# Patient Record
Sex: MALE | Race: WHITE | NOT HISPANIC OR LATINO | URBAN - METROPOLITAN AREA
[De-identification: names, ages, dates, MRNs, and addresses within clinical notes are randomized per-mention and may not be internally consistent; named-entity substitution may affect disease eponyms.]

---

## 2017-07-13 ENCOUNTER — INPATIENT (INPATIENT)
Facility: HOSPITAL | Age: 82
LOS: 1 days | Discharge: HOME | DRG: 194 | End: 2017-07-15
Attending: INTERNAL MEDICINE | Admitting: INTERNAL MEDICINE
Payer: COMMERCIAL

## 2017-07-13 VITALS
OXYGEN SATURATION: 100 % | SYSTOLIC BLOOD PRESSURE: 159 MMHG | DIASTOLIC BLOOD PRESSURE: 96 MMHG | TEMPERATURE: 98 F | HEIGHT: 64 IN | WEIGHT: 179.9 LBS | HEART RATE: 65 BPM | RESPIRATION RATE: 18 BRPM

## 2017-07-13 DIAGNOSIS — Z29.9 ENCOUNTER FOR PROPHYLACTIC MEASURES, UNSPECIFIED: ICD-10-CM

## 2017-07-13 DIAGNOSIS — J18.9 PNEUMONIA, UNSPECIFIED ORGANISM: ICD-10-CM

## 2017-07-13 DIAGNOSIS — I10 ESSENTIAL (PRIMARY) HYPERTENSION: ICD-10-CM

## 2017-07-13 DIAGNOSIS — B33.8 OTHER SPECIFIED VIRAL DISEASES: ICD-10-CM

## 2017-07-13 DIAGNOSIS — E87.1 HYPO-OSMOLALITY AND HYPONATREMIA: ICD-10-CM

## 2017-07-13 DIAGNOSIS — E78.5 HYPERLIPIDEMIA, UNSPECIFIED: ICD-10-CM

## 2017-07-13 DIAGNOSIS — J15.9 UNSPECIFIED BACTERIAL PNEUMONIA: ICD-10-CM

## 2017-07-13 DIAGNOSIS — R63.8 OTHER SYMPTOMS AND SIGNS CONCERNING FOOD AND FLUID INTAKE: ICD-10-CM

## 2017-07-13 DIAGNOSIS — I25.10 ATHEROSCLEROTIC HEART DISEASE OF NATIVE CORONARY ARTERY WITHOUT ANGINA PECTORIS: ICD-10-CM

## 2017-07-13 LAB
ALBUMIN SERPL ELPH-MCNC: 3.7 G/DL — SIGNIFICANT CHANGE UP (ref 3.3–5)
ALP SERPL-CCNC: 70 U/L — SIGNIFICANT CHANGE UP (ref 40–120)
ALT FLD-CCNC: 15 U/L — SIGNIFICANT CHANGE UP (ref 10–45)
ANION GAP SERPL CALC-SCNC: 17 MMOL/L — SIGNIFICANT CHANGE UP (ref 5–17)
AST SERPL-CCNC: 25 U/L — SIGNIFICANT CHANGE UP (ref 10–40)
BASOPHILS NFR BLD AUTO: 0.1 % — SIGNIFICANT CHANGE UP (ref 0–2)
BILIRUB SERPL-MCNC: 0.8 MG/DL — SIGNIFICANT CHANGE UP (ref 0.2–1.2)
BUN SERPL-MCNC: 18 MG/DL — SIGNIFICANT CHANGE UP (ref 7–23)
CALCIUM SERPL-MCNC: 9 MG/DL — SIGNIFICANT CHANGE UP (ref 8.4–10.5)
CHLORIDE SERPL-SCNC: 88 MMOL/L — LOW (ref 96–108)
CO2 SERPL-SCNC: 20 MMOL/L — LOW (ref 22–31)
CREAT SERPL-MCNC: 1.1 MG/DL — SIGNIFICANT CHANGE UP (ref 0.5–1.3)
EOSINOPHIL NFR BLD AUTO: 0.5 % — SIGNIFICANT CHANGE UP (ref 0–6)
GLUCOSE SERPL-MCNC: 95 MG/DL — SIGNIFICANT CHANGE UP (ref 70–99)
HCT VFR BLD CALC: 39.7 % — SIGNIFICANT CHANGE UP (ref 39–50)
HGB BLD-MCNC: 13.8 G/DL — SIGNIFICANT CHANGE UP (ref 13–17)
HPIV3 RNA SPEC QL NAA+PROBE: DETECTED
LYMPHOCYTES # BLD AUTO: 9 % — LOW (ref 13–44)
MCHC RBC-ENTMCNC: 29.3 PG — SIGNIFICANT CHANGE UP (ref 27–34)
MCHC RBC-ENTMCNC: 34.8 G/DL — SIGNIFICANT CHANGE UP (ref 32–36)
MCV RBC AUTO: 84.3 FL — SIGNIFICANT CHANGE UP (ref 80–100)
MONOCYTES NFR BLD AUTO: 8.2 % — SIGNIFICANT CHANGE UP (ref 2–14)
NEUTROPHILS NFR BLD AUTO: 82.2 % — HIGH (ref 43–77)
PLATELET # BLD AUTO: 196 K/UL — SIGNIFICANT CHANGE UP (ref 150–400)
POTASSIUM SERPL-MCNC: 4.5 MMOL/L — SIGNIFICANT CHANGE UP (ref 3.5–5.3)
POTASSIUM SERPL-SCNC: 4.5 MMOL/L — SIGNIFICANT CHANGE UP (ref 3.5–5.3)
PROT SERPL-MCNC: 6.9 G/DL — SIGNIFICANT CHANGE UP (ref 6–8.3)
RAPID RVP RESULT: DETECTED
RBC # BLD: 4.71 M/UL — SIGNIFICANT CHANGE UP (ref 4.2–5.8)
RBC # FLD: 12 % — SIGNIFICANT CHANGE UP (ref 10.3–16.9)
SODIUM SERPL-SCNC: 125 MMOL/L — LOW (ref 135–145)
WBC # BLD: 10 K/UL — SIGNIFICANT CHANGE UP (ref 3.8–10.5)
WBC # FLD AUTO: 10 K/UL — SIGNIFICANT CHANGE UP (ref 3.8–10.5)

## 2017-07-13 PROCEDURE — 93010 ELECTROCARDIOGRAM REPORT: CPT

## 2017-07-13 PROCEDURE — 99285 EMERGENCY DEPT VISIT HI MDM: CPT | Mod: 25

## 2017-07-13 PROCEDURE — 99223 1ST HOSP IP/OBS HIGH 75: CPT

## 2017-07-13 RX ORDER — CLOPIDOGREL BISULFATE 75 MG/1
75 TABLET, FILM COATED ORAL DAILY
Qty: 0 | Refills: 0 | Status: DISCONTINUED | OUTPATIENT
Start: 2017-07-13 | End: 2017-07-15

## 2017-07-13 RX ORDER — LISINOPRIL 2.5 MG/1
40 TABLET ORAL DAILY
Qty: 0 | Refills: 0 | Status: DISCONTINUED | OUTPATIENT
Start: 2017-07-13 | End: 2017-07-15

## 2017-07-13 RX ORDER — CEFTRIAXONE 500 MG/1
1 INJECTION, POWDER, FOR SOLUTION INTRAMUSCULAR; INTRAVENOUS ONCE
Qty: 0 | Refills: 0 | Status: COMPLETED | OUTPATIENT
Start: 2017-07-13 | End: 2017-07-13

## 2017-07-13 RX ORDER — SPIRONOLACTONE 25 MG/1
25 TABLET, FILM COATED ORAL DAILY
Qty: 0 | Refills: 0 | Status: DISCONTINUED | OUTPATIENT
Start: 2017-07-13 | End: 2017-07-14

## 2017-07-13 RX ORDER — METOPROLOL TARTRATE 50 MG
25 TABLET ORAL DAILY
Qty: 0 | Refills: 0 | Status: DISCONTINUED | OUTPATIENT
Start: 2017-07-13 | End: 2017-07-15

## 2017-07-13 RX ORDER — MAGNESIUM OXIDE 400 MG ORAL TABLET 241.3 MG
400 TABLET ORAL DAILY
Qty: 0 | Refills: 0 | Status: DISCONTINUED | OUTPATIENT
Start: 2017-07-13 | End: 2017-07-15

## 2017-07-13 RX ORDER — ACETAMINOPHEN 500 MG
650 TABLET ORAL EVERY 6 HOURS
Qty: 0 | Refills: 0 | Status: DISCONTINUED | OUTPATIENT
Start: 2017-07-13 | End: 2017-07-15

## 2017-07-13 RX ORDER — PANTOPRAZOLE SODIUM 20 MG/1
40 TABLET, DELAYED RELEASE ORAL
Qty: 0 | Refills: 0 | Status: DISCONTINUED | OUTPATIENT
Start: 2017-07-13 | End: 2017-07-15

## 2017-07-13 RX ORDER — ATORVASTATIN CALCIUM 80 MG/1
20 TABLET, FILM COATED ORAL AT BEDTIME
Qty: 0 | Refills: 0 | Status: DISCONTINUED | OUTPATIENT
Start: 2017-07-13 | End: 2017-07-15

## 2017-07-13 RX ORDER — CHOLECALCIFEROL (VITAMIN D3) 125 MCG
1000 CAPSULE ORAL DAILY
Qty: 0 | Refills: 0 | Status: DISCONTINUED | OUTPATIENT
Start: 2017-07-13 | End: 2017-07-15

## 2017-07-13 RX ORDER — HEPARIN SODIUM 5000 [USP'U]/ML
5000 INJECTION INTRAVENOUS; SUBCUTANEOUS EVERY 8 HOURS
Qty: 0 | Refills: 0 | Status: DISCONTINUED | OUTPATIENT
Start: 2017-07-13 | End: 2017-07-15

## 2017-07-13 RX ORDER — AZITHROMYCIN 500 MG/1
500 TABLET, FILM COATED ORAL ONCE
Qty: 0 | Refills: 0 | Status: COMPLETED | OUTPATIENT
Start: 2017-07-13 | End: 2017-07-13

## 2017-07-13 RX ADMIN — AZITHROMYCIN 255 MILLIGRAM(S): 500 TABLET, FILM COATED ORAL at 16:53

## 2017-07-13 RX ADMIN — ATORVASTATIN CALCIUM 20 MILLIGRAM(S): 80 TABLET, FILM COATED ORAL at 22:58

## 2017-07-13 RX ADMIN — HEPARIN SODIUM 5000 UNIT(S): 5000 INJECTION INTRAVENOUS; SUBCUTANEOUS at 22:58

## 2017-07-13 RX ADMIN — Medication 25 MILLIGRAM(S): at 22:58

## 2017-07-13 RX ADMIN — CEFTRIAXONE 100 GRAM(S): 500 INJECTION, POWDER, FOR SOLUTION INTRAMUSCULAR; INTRAVENOUS at 16:23

## 2017-07-13 NOTE — ED PROVIDER NOTE - PHYSICAL EXAMINATION
CONSTITUTIONAL: Well-appearing; well-nourished; in no apparent distress.   HEAD: Normocephalic; atraumatic.   EYES:  conjunctiva and sclera clear  ENT: normal nose; no rhinorrhea; normal pharynx with no erythema or lesions.   NECK: Supple; non-tender;   CARDIOVASCULAR: Normal S1, S2; no murmurs, rubs, or gallops. Regular rate and rhythm.   RESPIRATORY: Breathing easily; breath sounds clear and equal bilaterally; no wheezes, rhonchi, or rales.  GI: Soft; non-distended; non-tender; no palpable organomegaly.   EXT: No cyanosis or edema; N/V intact  SKIN: Normal for age and race; warm; dry; good turgor; no apparent lesions or rash.   NEURO: A & O x 3; face symmetric; grossly unremarkable.   PSYCHOLOGICAL: The patient’s mood and manner are appropriate.

## 2017-07-13 NOTE — ED PROVIDER NOTE - CARE PLAN
Principal Discharge DX:	Chest pain, unspecified type  Secondary Diagnosis:	Pneumonia of right lung due to infectious organism, unspecified part of lung  Secondary Diagnosis:	Hyponatremia

## 2017-07-13 NOTE — ED PROVIDER NOTE - MEDICAL DECISION MAKING DETAILS
87yo M here with PNA found on CT non con (scanned), with fevers last night above 100, also with CP this am, EKG non ischemic but will send troponins. will check labs, give IV abx, and dr. guaman req both cards and pulm eval 89yo M here with PNA found on CT non con (scanned), with fevers last night above 100, also with CP this am, EKG non ischemic but will send troponins. will check labs, give IV abx, and dr. guaman req both cards and pulm eval in the ED

## 2017-07-13 NOTE — H&P ADULT - NSHPPHYSICALEXAM_GEN_ALL_CORE
.  VITAL SIGNS:  T(C): 37.3 (07-13-17 @ 21:20), Max: 37.3 (07-13-17 @ 21:20)  T(F): 99.2 (07-13-17 @ 21:20), Max: 99.2 (07-13-17 @ 21:20)  HR: 78 (07-13-17 @ 21:53) (65 - 78)  BP: 137/64 (07-13-17 @ 21:53) (137/64 - 159/96)  BP(mean): 76 (07-13-17 @ 21:20) (76 - 76)  RR: 16 (07-13-17 @ 21:53) (14 - 18)  SpO2: 98% (07-13-17 @ 21:53) (98% - 100%)    PHYSICAL EXAM:    Constitutional: WDWN resting comfortably in bed; NAD  Head: NC/AT  Eyes: PERRL, EOMI, clear conjunctiva  ENT: no nasal discharge; uvula midline, no oropharyngeal erythema or exudates; MMM  Neck: supple; no JVD or thyromegaly  Respiratory: + egophany @ RLL. Otherwise good air movement in all lung fields. Normal I/E effort. no tachypnea or accessory muscle use.   Cardiac: midline surgical scar, which correlates w/ CABG hx. +S1/S2; RRR; + 2/6 systolic murmur @ RUSB   Gastrointestinal: soft, NT/ND; no rebound or guarding; +BSx4  Extremities: 1+ pedal edema   Musculoskeletal: NROM x4; no joint swelling, tenderness or erythema  Vascular: 2+ radial, femoral, DP/PT pulses B/L  Dermatologic: skin warm, dry and intact; no rashes, wounds, or scars  Neurologic: AAOx3; CNII-XII grossly intact; no focal deficits

## 2017-07-13 NOTE — PROGRESS NOTE ADULT - SUBJECTIVE AND OBJECTIVE BOX
I examined the patient today, reviewed the lab data, xrays and additional studies. Additionally I have reviewed the notes and assessments by the residents and consultants.   At this point I agree with the assessments and plan.  I would also advise close observation, and supportive care.  Pulmonary and cardiac follow up

## 2017-07-13 NOTE — PROGRESS NOTE ADULT - SUBJECTIVE AND OBJECTIVE BOX
Patient is a 88y old  Male who presents with a chief complaint of SOB and cough X 1 week    HPI:  An 89yo M hx of CAD s/p CABG on ASA/plavix, subdural hematoma s/p evacuation, hx stroke no residual, presenting for cough of one week duration with associated progressive SOB.  Cough is productive of yellowish sputum, and complained of nasal congestion.  He complained of chest pain at rest. He has associated fever and chills. Denied any previous Hx of PNA or pulmonary disease. CT chest from outside reported a RML and RLL peribhronchovascular interstitial PNA and pulmonary nodules.  He denied any smoking history. Vaccination is uptodate including pneumococcal. Denied weight loss or night sweat.     REVIEW OF SYSTEMS:  Constitutional: Had fever, fatigue  Eyes: No eye pain, visual disturbances, or discharge  ENMT: Positive for sinus or throat pain. No difficulty hearing, tinnitus, vertigo;   Neck: No pain, stiffness or neck swelling  Respiratory: See HPI  Cardiovascular: No chest pain, palpitations, dizziness or leg swelling  Gastrointestinal: No abdominal or epigastric pain. No nausea, vomiting or hematemesis; No diarrhea or constipation. No melena or hematochezia.  Genitourinary: No dysuria, frequency, hematuria or incontinence  Neurological: No headaches, memory loss, loss of strength, numbness or tremors  Skin: No itching, burning, rashes or lesions   Endocrine: No heat or cold intolerance; No hair loss  Musculoskeletal: No joint pain or swelling; No muscle, back or extremity pain  Psychiatric: No depression, anxiety, mood swings or difficulty sleeping    PAST MEDICAL & SURGICAL HISTORY:  HTN (hypertension)  CVA (cerebral vascular accident)  CAD (coronary artery disease)  No significant past surgical history      FAMILY HISTORY:      SOCIAL HISTORY:  Smoking Status: [ ] Current, [ ] Former, [X ] Never    MEDICATIONS:  Cef/Azithro    Allergies    No Known Allergies    Intolerances        Vital Signs Last 24 Hrs  T(C): 36.7 (13 Jul 2017 13:55), Max: 36.7 (13 Jul 2017 13:55)  T(F): 98 (13 Jul 2017 13:55), Max: 98 (13 Jul 2017 13:55)  HR: 65 (13 Jul 2017 13:55) (65 - 65)  BP: 159/96 (13 Jul 2017 13:55) (159/96 - 159/96)  BP(mean): --  RR: 18 (13 Jul 2017 13:55) (18 - 18)  SpO2: 100% (13 Jul 2017 13:55) (100% - 100%)        PHYSICAL EXAM:    General: Well developed; well nourished; in no acute distress  Eyes: PERRL, EOM intact; conjunctiva and sclera clear  Head: Normocephalic; atraumatic  ENMT: No nasal discharge; airway clear  Neck: Supple; non tender; no masses  Respiratory: Clear to auscultation bilaterally without wheezing, rhonchi or rales  Cardiovascular: Regular rate and rhythm. S1 and S2 Normal; No murmurs, gallops or rubs  Gastrointestinal: Soft non-tender non-distended; Normal bowel sounds; No hepatosplenomegaly  Genitourinary: No costovertebral angle tenderness  Extremities: Normal range of motion, No clubbing, cyanosis or edema  Vascular: Peripheral pulses palpable 2+ bilaterally  Neurological: Alert and oriented x3  Skin: Warm and dry. No obvious rash  Lymph Nodes: No acute cervical or supraclavicular adenopathy  Musculoskeletal: Normal gait, tone, without deformities      LABS:      CBC Full  -  ( 13 Jul 2017 17:06 )  WBC Count : 10.0 K/uL  Hemoglobin : 13.8 g/dL  Hematocrit : 39.7 %  Platelet Count - Automated : 196 K/uL  Mean Cell Volume : 84.3 fL  Mean Cell Hemoglobin : 29.3 pg  Mean Cell Hemoglobin Concentration : 34.8 g/dL  Auto Neutrophil # : x  Auto Lymphocyte # : x  Auto Monocyte # : x  Auto Eosinophil # : x  Auto Basophil # : x  Auto Neutrophil % : 82.2 %  Auto Lymphocyte % : 9.0 %  Auto Monocyte % : 8.2 %  Auto Eosinophil % : 0.5 %  Auto Basophil % : 0.1 %    07-13    125<L>  |  88<L>  |  18  ----------------------------<  95  4.5   |  20<L>  |  1.10    Ca    9.0      13 Jul 2017 17:06    TPro  6.9  /  Alb  3.7  /  TBili  0.8  /  DBili  x   /  AST  25  /  ALT  15  /  AlkPhos  70  07-13        RADIOLOGY & ADDITIONAL STUDIES (The following images were personally reviewed): Patient is a 88y old  Male who presents with a chief complaint of SOB and cough X 1 week    HPI:  An 87yo M hx of CAD s/p CABG on ASA/plavix, subdural hematoma s/p evacuation, hx stroke no residual, presenting for cough of one week duration with associated progressive SOB.  Cough is productive of yellowish sputum, and complained of nasal congestion.  He complained of chest pain at rest. He has associated fever and chills. Denied any previous Hx of PNA or pulmonary disease. CT chest from outside reported a RML and RLL peribhronchovascular interstitial PNA and pulmonary nodules.  He denied any smoking history. Vaccination is uptodate including pneumococcal. Denied weight loss or night sweat. Denies any exposure to mold, birds/exotic pets, or toxic inhalation.    REVIEW OF SYSTEMS:  Constitutional: Had fever, fatigue  Eyes: No eye pain, visual disturbances, or discharge  ENMT: Positive for sinus or throat pain. No difficulty hearing, tinnitus, vertigo;   Neck: No pain, stiffness or neck swelling  Respiratory: See HPI  Cardiovascular: No chest pain, palpitations, dizziness or leg swelling  Gastrointestinal: No abdominal or epigastric pain. No nausea, vomiting or hematemesis; No diarrhea or constipation. No melena or hematochezia.  Genitourinary: No dysuria, frequency, hematuria or incontinence  Neurological: No headaches, memory loss, loss of strength, numbness or tremors  Skin: No itching, burning, rashes or lesions   Endocrine: No heat or cold intolerance; No hair loss  Musculoskeletal: No joint pain or swelling; No muscle, back or extremity pain  Psychiatric: No depression, anxiety, mood swings or difficulty sleeping    PAST MEDICAL & SURGICAL HISTORY:  HTN (hypertension)  CVA (cerebral vascular accident)  CAD (coronary artery disease)  No significant past surgical history      FAMILY HISTORY:      SOCIAL HISTORY:  Smoking Status: [ ] Current, [ ] Former, [X ] Never    MEDICATIONS:  Cef/Azithro    Allergies    No Known Allergies    Intolerances        Vital Signs Last 24 Hrs  T(C): 36.7 (13 Jul 2017 13:55), Max: 36.7 (13 Jul 2017 13:55)  T(F): 98 (13 Jul 2017 13:55), Max: 98 (13 Jul 2017 13:55)  HR: 65 (13 Jul 2017 13:55) (65 - 65)  BP: 159/96 (13 Jul 2017 13:55) (159/96 - 159/96)  BP(mean): --  RR: 18 (13 Jul 2017 13:55) (18 - 18)  SpO2: 100% (13 Jul 2017 13:55) (100% - 100%)        PHYSICAL EXAM:    General: Well developed; well nourished; in no acute distress  Eyes: PERRL, EOM intact; conjunctiva and sclera clear  Head: Normocephalic; atraumatic  ENMT: No nasal discharge; airway clear  Neck: Supple; non tender; no masses  Respiratory: Clear to auscultation bilaterally without wheezing, rhonchi or rales  Cardiovascular: Regular rate and rhythm. S1 and S2 Normal; No murmurs, gallops or rubs  Gastrointestinal: Soft non-tender non-distended; Normal bowel sounds; No hepatosplenomegaly  Genitourinary: No costovertebral angle tenderness  Extremities: Normal range of motion, No clubbing, cyanosis or edema  Vascular: Peripheral pulses palpable 2+ bilaterally  Neurological: Alert and oriented x3  Skin: Warm and dry. No obvious rash  Lymph Nodes: No acute cervical or supraclavicular adenopathy  Musculoskeletal: Normal gait, tone, without deformities      LABS:      CBC Full  -  ( 13 Jul 2017 17:06 )  WBC Count : 10.0 K/uL  Hemoglobin : 13.8 g/dL  Hematocrit : 39.7 %  Platelet Count - Automated : 196 K/uL  Mean Cell Volume : 84.3 fL  Mean Cell Hemoglobin : 29.3 pg  Mean Cell Hemoglobin Concentration : 34.8 g/dL  Auto Neutrophil # : x  Auto Lymphocyte # : x  Auto Monocyte # : x  Auto Eosinophil # : x  Auto Basophil # : x  Auto Neutrophil % : 82.2 %  Auto Lymphocyte % : 9.0 %  Auto Monocyte % : 8.2 %  Auto Eosinophil % : 0.5 %  Auto Basophil % : 0.1 %    07-13    125<L>  |  88<L>  |  18  ----------------------------<  95  4.5   |  20<L>  |  1.10    Ca    9.0      13 Jul 2017 17:06    TPro  6.9  /  Alb  3.7  /  TBili  0.8  /  DBili  x   /  AST  25  /  ALT  15  /  AlkPhos  70  07-13        RADIOLOGY & ADDITIONAL STUDIES (The following images were personally reviewed): Patient is a 88y old  Male who presents with a chief complaint of SOB and cough X 1 week    HPI:  An 89yo M hx of CAD s/p CABG on ASA/plavix, subdural hematoma s/p evacuation, hx stroke no residual, presenting for cough of one week duration with associated progressive SOB.  Cough is productive of yellowish sputum, and complained of nasal congestion.  He complained of chest pain at rest. He has associated fever and chills. Denied any previous Hx of PNA or pulmonary disease. CT chest from outside reported a RML and RLL peribhronchovascular interstitial PNA and pulmonary nodules.  He denied any smoking history. Vaccination is uptodate including pneumococcal. Denied weight loss or night sweat. Denies any exposure to mold, birds/exotic pets, or toxic inhalation.    REVIEW OF SYSTEMS:  Constitutional: Had fever, fatigue  Eyes: No eye pain, visual disturbances, or discharge  ENMT: Positive for sinus or throat pain. No difficulty hearing, tinnitus, vertigo;   Neck: No pain, stiffness or neck swelling  Respiratory: See HPI  Cardiovascular: No chest pain, palpitations, dizziness or leg swelling  Gastrointestinal: No abdominal or epigastric pain. No nausea, vomiting or hematemesis; No diarrhea or constipation. No melena or hematochezia.  Genitourinary: No dysuria, frequency, hematuria or incontinence  Neurological: No headaches, memory loss, loss of strength, numbness or tremors  Skin: No itching, burning, rashes or lesions   Endocrine: No heat or cold intolerance; No hair loss  Musculoskeletal: No joint pain or swelling; No muscle, back or extremity pain  Psychiatric: No depression, anxiety, mood swings or difficulty sleeping    PAST MEDICAL & SURGICAL HISTORY:  HTN (hypertension)  CVA (cerebral vascular accident)  CAD (coronary artery disease)  No significant past surgical history      FAMILY HISTORY:      SOCIAL HISTORY:  Smoking Status: [ ] Current, [ ] Former, [X ] Never    MEDICATIONS:  Cef/Azithro    Allergies    No Known Allergies    Intolerances        Vital Signs Last 24 Hrs  T(C): 36.7 (13 Jul 2017 13:55), Max: 36.7 (13 Jul 2017 13:55)  T(F): 98 (13 Jul 2017 13:55), Max: 98 (13 Jul 2017 13:55)  HR: 65 (13 Jul 2017 13:55) (65 - 65)  BP: 159/96 (13 Jul 2017 13:55) (159/96 - 159/96)  BP(mean): --  RR: 18 (13 Jul 2017 13:55) (18 - 18)  SpO2: 100% (13 Jul 2017 13:55) (100% - 100%)        PHYSICAL EXAM:    General: Well developed; well nourished; in no acute distress  Eyes: PERRL, EOM intact; conjunctiva and sclera clear  Head: Normocephalic; atraumatic  ENMT: No nasal discharge; airway clear  Neck: Supple; non tender; no masses  Respiratory: Mildly decreased breath sound in right basal, otherwise clear to auscultation bilaterally without wheezing, rhonchi or rales  Cardiovascular: Regular rate and rhythm. S1 and S2 Normal; No murmurs, gallops or rubs  Gastrointestinal: Soft non-tender non-distended; Normal bowel sounds; No hepatosplenomegaly  Genitourinary: No costovertebral angle tenderness  Extremities: Normal range of motion, No clubbing, cyanosis or edema  Vascular: Peripheral pulses palpable 2+ bilaterally  Neurological: Alert and oriented x3  Skin: Warm and dry. No obvious rash  Lymph Nodes: No acute cervical or supraclavicular adenopathy  Musculoskeletal: Normal gait, tone, without deformities      LABS:      CBC Full  -  ( 13 Jul 2017 17:06 )  WBC Count : 10.0 K/uL  Hemoglobin : 13.8 g/dL  Hematocrit : 39.7 %  Platelet Count - Automated : 196 K/uL  Mean Cell Volume : 84.3 fL  Mean Cell Hemoglobin : 29.3 pg  Mean Cell Hemoglobin Concentration : 34.8 g/dL  Auto Neutrophil # : x  Auto Lymphocyte # : x  Auto Monocyte # : x  Auto Eosinophil # : x  Auto Basophil # : x  Auto Neutrophil % : 82.2 %  Auto Lymphocyte % : 9.0 %  Auto Monocyte % : 8.2 %  Auto Eosinophil % : 0.5 %  Auto Basophil % : 0.1 %    07-13    125<L>  |  88<L>  |  18  ----------------------------<  95  4.5   |  20<L>  |  1.10    Ca    9.0      13 Jul 2017 17:06    TPro  6.9  /  Alb  3.7  /  TBili  0.8  /  DBili  x   /  AST  25  /  ALT  15  /  AlkPhos  70  07-13        RADIOLOGY & ADDITIONAL STUDIES (The following images were personally reviewed): Pulmonary Consult  Consult from: Dr. Evans    Patient is a 88y old  Male who presents with a chief complaint of SOB and cough X 1 week    HPI:  An 89yo M hx of CAD s/p CABG on ASA/plavix, subdural hematoma s/p evacuation, hx stroke no residual, presenting for cough of one week duration with associated progressive SOB.  Cough is productive of yellowish sputum, and complained of nasal congestion.  He complained of chest pain at rest. He has associated fever and chills. Denied any previous Hx of PNA or pulmonary disease. CT chest from outside reported a RML and RLL peribhronchovascular interstitial PNA and pulmonary nodules.  He denied any smoking history. Vaccination is uptodate including pneumococcal. Denied weight loss or night sweat. Denies any exposure to mold, birds/exotic pets, or toxic inhalation.    REVIEW OF SYSTEMS:  Constitutional: Had fever, fatigue  Eyes: No eye pain, visual disturbances, or discharge  ENMT: Positive for sinus or throat pain. No difficulty hearing, tinnitus, vertigo;   Neck: No pain, stiffness or neck swelling  Respiratory: See HPI  Cardiovascular: No chest pain, palpitations, dizziness or leg swelling  Gastrointestinal: No abdominal or epigastric pain. No nausea, vomiting or hematemesis; No diarrhea or constipation. No melena or hematochezia.  Genitourinary: No dysuria, frequency, hematuria or incontinence  Neurological: No headaches, memory loss, loss of strength, numbness or tremors  Skin: No itching, burning, rashes or lesions   Endocrine: No heat or cold intolerance; No hair loss  Musculoskeletal: No joint pain or swelling; No muscle, back or extremity pain  Psychiatric: No depression, anxiety, mood swings or difficulty sleeping    PAST MEDICAL & SURGICAL HISTORY:  HTN (hypertension)  CVA (cerebral vascular accident)  CAD (coronary artery disease)  No significant past surgical history      FAMILY HISTORY:      SOCIAL HISTORY:  Smoking Status: [ ] Current, [ ] Former, [X ] Never    MEDICATIONS:  Cef/Azithro    Allergies    No Known Allergies    Intolerances        Vital Signs Last 24 Hrs  T(C): 36.7 (13 Jul 2017 13:55), Max: 36.7 (13 Jul 2017 13:55)  T(F): 98 (13 Jul 2017 13:55), Max: 98 (13 Jul 2017 13:55)  HR: 65 (13 Jul 2017 13:55) (65 - 65)  BP: 159/96 (13 Jul 2017 13:55) (159/96 - 159/96)  BP(mean): --  RR: 18 (13 Jul 2017 13:55) (18 - 18)  SpO2: 100% (13 Jul 2017 13:55) (100% - 100%)        PHYSICAL EXAM:    General: Well developed; well nourished; in no acute distress  Eyes: PERRL, EOM intact; conjunctiva and sclera clear  Head: Normocephalic; atraumatic  ENMT: No nasal discharge; airway clear  Neck: Supple; non tender; no masses  Respiratory: Mildly decreased breath sound in right basal, otherwise clear to auscultation bilaterally without wheezing, rhonchi or rales  Cardiovascular: Regular rate and rhythm. S1 and S2 Normal; No murmurs, gallops or rubs  Gastrointestinal: Soft non-tender non-distended; Normal bowel sounds; No hepatosplenomegaly  Genitourinary: No costovertebral angle tenderness  Extremities: Normal range of motion, No clubbing, cyanosis or edema  Vascular: Peripheral pulses palpable 2+ bilaterally  Neurological: Alert and oriented x3  Skin: Warm and dry. No obvious rash  Lymph Nodes: No acute cervical or supraclavicular adenopathy  Musculoskeletal: Normal gait, tone, without deformities      LABS:      CBC Full  -  ( 13 Jul 2017 17:06 )  WBC Count : 10.0 K/uL  Hemoglobin : 13.8 g/dL  Hematocrit : 39.7 %  Platelet Count - Automated : 196 K/uL  Mean Cell Volume : 84.3 fL  Mean Cell Hemoglobin : 29.3 pg  Mean Cell Hemoglobin Concentration : 34.8 g/dL  Auto Neutrophil # : x  Auto Lymphocyte # : x  Auto Monocyte # : x  Auto Eosinophil # : x  Auto Basophil # : x  Auto Neutrophil % : 82.2 %  Auto Lymphocyte % : 9.0 %  Auto Monocyte % : 8.2 %  Auto Eosinophil % : 0.5 %  Auto Basophil % : 0.1 %    07-13    125<L>  |  88<L>  |  18  ----------------------------<  95  4.5   |  20<L>  |  1.10    Ca    9.0      13 Jul 2017 17:06    TPro  6.9  /  Alb  3.7  /  TBili  0.8  /  DBili  x   /  AST  25  /  ALT  15  /  AlkPhos  70  07-13        RADIOLOGY & ADDITIONAL STUDIES (The following images were personally reviewed):

## 2017-07-13 NOTE — H&P ADULT - PROBLEM SELECTOR PLAN 8
DASH Diet  Monitor/ Replete Lytes PRN  No IVF    FULL CODE  Daughter: Cande, [phone]'    Dispo: [address] [facility] under Dr. Main

## 2017-07-13 NOTE — PROGRESS NOTE ADULT - ASSESSMENT
An 87yo M hx of CAD s/p CABG on ASA/plavix, subdural hematoma s/p evacuation, hx stroke no residual, presenting for cough of one week duration with associated progressive SOB, productive cough and fever.

## 2017-07-13 NOTE — PROGRESS NOTE ADULT - PROBLEM SELECTOR PLAN 1
He has a clinical presentation of Pneumonia and CT imaging showing  RML and RLL peribronchovascular interstitial PNA with reticulonodular appearance with some pulmonary nodules in RML measuring 1.2 cm.(Image couldn't be reviewed as we have only report)  -Agree with CAP coverage with ceftriaxone and Azithromycin.  -Blood culture, sputum culture  -Please send procalcitonin  -Requested the daughter to bring us the copy of CD for review. We will need to check CXR atleast now.  -Cardiac work up for the chest pain sofar negative(Normal Trops and ECG). Cardiology work up and evaluation in process.  -We will need to have a follow up CT to evaluate for resolution of consolidation and nodules reported , to r/o underlying malignancy. He has a clinical presentation of Pneumonia and CT imaging showing  RML and RLL peribronchovascular interstitial PNA with reticulonodular appearance with some pulmonary nodules in RML measuring 1.2 cm.(Image couldn't be reviewed as we have only report). We would have to review it to r/o possible underlying malignancy, other ILD with peribronchovascular distribution.  -Requested the daughter to bring us the copy of CD for review.   -Agree with CAP coverage with ceftriaxone and Azithromycin.  -Blood culture, sputum culture  -Please send procalcitonin  -Cardiac work up for the chest pain sofar negative(Normal Trops and ECG). Cardiology work up and evaluation in process.  -We will need to have a follow up CT to evaluate for resolution of consolidation and nodules reported , to r/o underlying malignancy or other ILD(though the unilateral lesion speaks against it). He has a clinical presentation of Pneumonia and CT imaging showing  RML and RLL peribronchovascular interstitial PNA with reticulonodular appearance with some pulmonary nodules in RML measuring 1.2 cm.(Image couldn't be reviewed as we have only report). We would have to review it to r/o possible underlying malignancy, other ILD with peribronchovascular distribution.  -Requested the daughter to bring us the copy of CD for review.   -Agree with CAP coverage with ceftriaxone and Azithromycin.  -Check urine legionella AG(possibility with hyponatremia), check blood strept Ag, mycoplasma pneumoniae IGG/IGM, Respiratory viral panel  -Blood culture, sputum culture  -Please send procalcitonin  -Cardiac work up for the chest pain sofar negative(Normal Trops and ECG). Cardiology work up and evaluation in process.  -We will need to have a follow up CT to evaluate for resolution of consolidation and nodules reported , to r/o underlying malignancy or other ILD(though the unilateral lesion speaks against it). He has a clinical presentation of Pneumonia and CT imaging showing  RML and RLL peribronchovascular interstitial PNA with reticulonodular appearance with some pulmonary nodules in RML measuring 1.2 cm.(Image couldn't be reviewed as we have only report). We would have to review it to r/o possible underlying malignancy, other ILD with peribronchovascular distribution.  -Requested the daughter to bring us the copy of CD for review.   -Agree with CAP coverage with ceftriaxone and Azithromycin.  -Check urine legionella AG(possibility with hyponatremia), check blood strept Ag, mycoplasma pneumoniae IGG/IGM, Respiratory viral panel  -Blood culture, sputum culture  -Please send procalcitonin  -Cardiac work up for the chest pain sofar negative(Normal Trops and ECG). Cardiology work up and evaluation in process.  -We will need to have a follow up CT to evaluate for resolution of consolidation and nodules reported , to r/o underlying malignancy or other ILD(though the unilateral lesion speaks against it).  -PSI score 108 Risk Class IV, 8.2-9.3% mortality. Hospitalization recommended based on risk.

## 2017-07-13 NOTE — H&P ADULT - HISTORY OF PRESENT ILLNESS
89 y/o M w/ pmhx of CAD s/p CABG (19 yrs ago @ [place]), subdural hematoma s/p evacuation, CVA (no residual defecits), presents with SOB and cough x 1 week. SOB has been progressive over the course of the week and the cough is productive with yellow sputum. Patient also with nasal congestion throughout this time with low grade fevers and chills. Patient states temp at home was 100.1. Patient denies any previous lung pathology. Patient states he went to visit PMD and he was sent for a CT of his chest, which showed RML and RLL peribronchovascular interstitial PNA with pulmonary nodules. Vaccinations are up to date including pneumococcal. Patient denies any headaches, vision changes, chest pain, abdominal pain, N/V/D, weight changes, urinary or bowel symptoms. Patient denies any recent exposure to sick contacts.     Upon arrival to the ED, VS remained stable. Temp: 99.0, BP: 149/84, HR: 76, RR: 14, O2: 98% on room air. Labs significant for hyponatremia of 125. RVP + parainfluenza. Troponin negative x 1. EKG with no signs of ischemia. Patient to be admitted under Cardiology.

## 2017-07-13 NOTE — H&P ADULT - PROBLEM SELECTOR PLAN 4
On Plavix at home. NOT on ASA. No chest pain upon arrival. Troponin negative x 1. EKG negative for ischemia  - c/w Plavix 75  - consider echocardiogram ?

## 2017-07-13 NOTE — ED CLERICAL - NS ED CLERK NOTE PRE-ARRIVAL INFORMATION; ADDITIONAL PRE-ARRIVAL INFORMATION
87 Y/O M ABDIEL FIGUEROA BEING SENT IN BY DR IRVIN FOR PNEUMONIA CP SOB SINUSITIS PLEASE CALL DR JORGENSEN FOR PULMONARY AND DR INFANTE FOR CARDIO (LL)

## 2017-07-13 NOTE — H&P ADULT - PROBLEM SELECTOR PLAN 1
SOB and productive cough x 1 week. 0/4 SIRS upon arrival. CT Chest done as outpatient suggestive of RML, RLL peribronchovascular interstitial PNA, likely 2/2 community acquired PNA. Unlikely HCAP in the absence of MRSA risk factors. No recent hospitalizations. Patient w/ hyponatremia, cannot r/o Legionella in setting of UES outbreak. Patient HD stable, afebrile w/o leukocytosis.  - will treat with Ceftriaxone + Azithromycin for CAP  - f/u urine legionella   - f/u Mycoplasma, Strep serology  - pulmonary on board, f/u rec's in regards to pulmonary nodules. negative for B-symptoms  - monitor respiratory status. SOB and productive cough x 1 week. 0/4 SIRS upon arrival. CT Chest done as outpatient suggestive of RML, RLL peribronchovascular interstitial PNA, likely 2/2 community acquired PNA. Unlikely HCAP in the absence of MRSA risk factors. No recent hospitalizations. Patient w/ hyponatremia, cannot r/o Legionella in setting of UES outbreak. Patient HD stable, afebrile w/o leukocytosis.  - will treat with Ceftriaxone + Azithromycin for CAP  - f/u urine legionella   - f/u Mycoplasma, Strep serology  - pulmonary on board, f/u rec's in regards to pulmonary nodules. negative for B-symptoms  - monitor respiratory status.  - f/u AM CXR SOB and productive cough x 1 week. 0/4 SIRS upon arrival. CT Chest done as outpatient suggestive of RML, RLL peribronchovascular interstitial PNA, likely 2/2 community acquired PNA. Unlikely HCAP in the absence of MRSA risk factors. No recent hospitalizations. Patient w/ hyponatremia, cannot r/o Legionella in setting of UES outbreak. Patient HD stable, afebrile w/o leukocytosis.  - will treat with Ceftriaxone + Azithromycin for CAP  - f/u urine legionella   - f/u Mycoplasma, Strep serology  - f/u Blood Cx   - pulmonary on board, f/u rec's in regards to pulmonary nodules. negative for B-symptoms  - monitor respiratory status.  - f/u AM CXR

## 2017-07-13 NOTE — H&P ADULT - PROBLEM SELECTOR PLAN 2
Unclear etiology. Patient euvolemic on exam. Possibly 2/2 lung pathology. Etiology includes legionella vs SIADH vs hypovolemia  - urine legionella  - urine lytes   - trend BMP

## 2017-07-13 NOTE — H&P ADULT - ATTENDING COMMENTS
Agree with History and Physical, History of Present Illness, Allergies/Medications, Patient History, Risk Assessment, Physical Exam, Labs and Results, Assessment and Plan

## 2017-07-13 NOTE — ED ADULT NURSE NOTE - OBJECTIVE STATEMENT
Patient arrived to ED via walk-in stating, "I have had a cough for over a week.  I am coughing up green phlegm."  Patient is A&Ox3, in NAD, complaining of productive cough and VOGEL.  Per patient, PMD sending him in for confirmed PNA.  PMHx of CAD with CABG, CVA and HTN.  Will continue with plan of care.

## 2017-07-13 NOTE — H&P ADULT - ASSESSMENT
89 y/o M w/ pmhx of CAD s/p CABG (19 yrs ago @ [place]), subdural hematoma s/p evacuation, CVA (no residual defecits), being admitted for CAP.

## 2017-07-13 NOTE — ED PROVIDER NOTE - OBJECTIVE STATEMENT
87yo M hx of CAD s/p CABG on ASA/plavix, subdural hematoma s/p evacuation, hx stroke no residual, here today for 9 days of cough congestion, worsening. had ct non con today w/ shows RML and RLL peribhronchovascular interstitial PNA. Also had episode of CP this am with tightness, so sent to ED given his hx of CAD. Pt states compliant with meds. Denies any VOGEL, but is feeling SOB at rest in general compared to a week ago, really feels it when he speaks. Cough productive of sputum, and having continued nasal congestion that is not improved

## 2017-07-13 NOTE — H&P ADULT - NSHPREVIEWOFSYSTEMS_GEN_ALL_CORE
ROS + SOB, Cough  ROS negative for headaches, vision changes, chest pain, abdominal pain, N/V/D, weight changes, urinary or bowel symptoms. Patient denies any recent exposure to sick contacts.

## 2017-07-13 NOTE — CONSULT NOTE ADULT - ATTENDING COMMENTS
An 87yo M hx of:  CAD s/p CABG on ASA/plavix  subdural hematoma s/p evacuation  hx stroke no residual deficit    He presented with cough x 1week duration with associated progressive SOB  Cough is productive of yellowish sputum. He has associated fever and chills.  Denies weight loss or night sweats.   Additionally, patient complained of nasal congestion.    He complained of chest pain at rest.  Denies any exposure to mold, birds/exotic pets, or toxic inhalation.  Denied any previous Hx of PNA or pulmonary disease.   CT chest from outside reported a RML and RLL loreto-bronchovascular interstitial PNA and pulmonary nodules.  We have not reviewed the CT chest, which we are trying to get from the outside facility.  He denied any smoking history.   The patient had received pneumococcal vaccine in the past.   Agree with physical exam. Does not appear to be toxic. No fevers. SpO2 on RA at rest  is 95%. Lungs demonstrated no adventitious sounds. No rash. No ear discharge. Cardiovascular exam: No murmurs.  CXR shows mild right paracardiac haziness. Difficult to distinguish parenchymal opacity from pulmonary vessels. Lateral not done.  Labs: WBC 10K. PMN 82.2%  Na 125  Throat swab: Para-influenza 3 noted  Assessment:  Treat for community acquired pneumonia for now. Agree with Ceftriaxone and Azithromycin. F/U clinical response. Check Pro-calcitonin C.  It is possible that if nodules were seen on CT scan, the opacity and nodules may be due to parainfluenza pneumonia  Send Urine for Legionella pneumophilia A antigen. Send PCR for Mycoplasma and Chlamydia  Blood and sputum cultures pending  We are awaiting results of CT chest  We would like to An 87yo M hx of:  CAD s/p CABG on ASA/plavix  subdural hematoma s/p evacuation  hx stroke no residual deficit    He presented with cough x 1week duration with associated progressive SOB  Cough is productive of yellowish sputum. He has associated fever and chills.  Denies weight loss or night sweats.   Additionally, patient complained of nasal congestion.    He complained of chest pain at rest.  Denies any exposure to mold, birds/exotic pets, or toxic inhalation.  Denied any previous Hx of PNA or pulmonary disease.   CT chest from outside reported a RML and RLL loreto-bronchovascular interstitial PNA and pulmonary nodules.  We have not reviewed the CT chest, which we are trying to get from the outside facility.  He denied any smoking history.   The patient had received pneumococcal vaccine in the past.   Agree with physical exam. Does not appear to be toxic. No fevers. SpO2 on RA at rest  is 95%. Lungs demonstrated expiratory wheezing at basal lung field. No rash. No ear discharge. Cardiovascular positive for 1/6 systolic murmur at the aortic area.  CXR shows mild right paracardiac haziness. Difficult to distinguish parenchymal opacity from pulmonary vessels. Lateral not done.  Labs: WBC 10K. PMN 82.2%  Na 125  Throat swab: Para-influenza 3 noted  Assessment:  Treat for community acquired pneumonia for now. Agree with Ceftriaxone and Azithromycin. F/U clinical response. Check Pro-calcitonin C.  It is possible that if nodules were seen on CT scan, the opacity and nodules may be due to parainfluenza pneumonia  Send Urine for Legionella pneumophilia A antigen. Send PCR for Mycoplasma and Chlamydia  Blood and sputum cultures pending  We are awaiting results of CT chest  We would like to

## 2017-07-14 DIAGNOSIS — I63.9 CEREBRAL INFARCTION, UNSPECIFIED: ICD-10-CM

## 2017-07-14 LAB
ANION GAP SERPL CALC-SCNC: 12 MMOL/L — SIGNIFICANT CHANGE UP (ref 5–17)
ANION GAP SERPL CALC-SCNC: 13 MMOL/L — SIGNIFICANT CHANGE UP (ref 5–17)
ANION GAP SERPL CALC-SCNC: 15 MMOL/L — SIGNIFICANT CHANGE UP (ref 5–17)
BUN SERPL-MCNC: 13 MG/DL — SIGNIFICANT CHANGE UP (ref 7–23)
CALCIUM SERPL-MCNC: 8.6 MG/DL — SIGNIFICANT CHANGE UP (ref 8.4–10.5)
CALCIUM SERPL-MCNC: 8.7 MG/DL — SIGNIFICANT CHANGE UP (ref 8.4–10.5)
CALCIUM SERPL-MCNC: 8.8 MG/DL — SIGNIFICANT CHANGE UP (ref 8.4–10.5)
CHLORIDE SERPL-SCNC: 86 MMOL/L — LOW (ref 96–108)
CHLORIDE SERPL-SCNC: 89 MMOL/L — LOW (ref 96–108)
CHLORIDE SERPL-SCNC: 90 MMOL/L — LOW (ref 96–108)
CHOLEST SERPL-MCNC: 117 MG/DL — SIGNIFICANT CHANGE UP (ref 10–199)
CO2 SERPL-SCNC: 20 MMOL/L — LOW (ref 22–31)
CO2 SERPL-SCNC: 21 MMOL/L — LOW (ref 22–31)
CO2 SERPL-SCNC: 21 MMOL/L — LOW (ref 22–31)
CREAT ?TM UR-MCNC: 57 MG/DL — SIGNIFICANT CHANGE UP
CREAT SERPL-MCNC: 1 MG/DL — SIGNIFICANT CHANGE UP (ref 0.5–1.3)
CREAT SERPL-MCNC: 1 MG/DL — SIGNIFICANT CHANGE UP (ref 0.5–1.3)
CREAT SERPL-MCNC: 1.1 MG/DL — SIGNIFICANT CHANGE UP (ref 0.5–1.3)
GLUCOSE SERPL-MCNC: 111 MG/DL — HIGH (ref 70–99)
GLUCOSE SERPL-MCNC: 119 MG/DL — HIGH (ref 70–99)
GLUCOSE SERPL-MCNC: 139 MG/DL — HIGH (ref 70–99)
HCT VFR BLD CALC: 38 % — LOW (ref 39–50)
HDLC SERPL-MCNC: 35 MG/DL — LOW (ref 40–125)
HGB BLD-MCNC: 13.6 G/DL — SIGNIFICANT CHANGE UP (ref 13–17)
LEGIONELLA AG UR QL: NEGATIVE — SIGNIFICANT CHANGE UP
LIPID PNL WITH DIRECT LDL SERPL: 68 MG/DL — SIGNIFICANT CHANGE UP
MCHC RBC-ENTMCNC: 30.2 PG — SIGNIFICANT CHANGE UP (ref 27–34)
MCHC RBC-ENTMCNC: 35.8 G/DL — SIGNIFICANT CHANGE UP (ref 32–36)
MCV RBC AUTO: 84.3 FL — SIGNIFICANT CHANGE UP (ref 80–100)
OSMOLALITY SERPL: 257 MOSM/KG — LOW (ref 280–301)
OSMOLALITY UR: 510 MOSMOL/KG — SIGNIFICANT CHANGE UP (ref 100–650)
PHOSPHATE SERPL-MCNC: 2.4 MG/DL — LOW (ref 2.5–4.5)
PLATELET # BLD AUTO: 198 K/UL — SIGNIFICANT CHANGE UP (ref 150–400)
POTASSIUM SERPL-MCNC: 4.1 MMOL/L — SIGNIFICANT CHANGE UP (ref 3.5–5.3)
POTASSIUM SERPL-MCNC: 4.4 MMOL/L — SIGNIFICANT CHANGE UP (ref 3.5–5.3)
POTASSIUM SERPL-MCNC: 4.5 MMOL/L — SIGNIFICANT CHANGE UP (ref 3.5–5.3)
POTASSIUM SERPL-SCNC: 4.1 MMOL/L — SIGNIFICANT CHANGE UP (ref 3.5–5.3)
POTASSIUM SERPL-SCNC: 4.4 MMOL/L — SIGNIFICANT CHANGE UP (ref 3.5–5.3)
POTASSIUM SERPL-SCNC: 4.5 MMOL/L — SIGNIFICANT CHANGE UP (ref 3.5–5.3)
PROCALCITONIN SERPL-MCNC: 0.12 NG/ML — HIGH (ref 0–0.04)
RBC # BLD: 4.51 M/UL — SIGNIFICANT CHANGE UP (ref 4.2–5.8)
RBC # FLD: 12.1 % — SIGNIFICANT CHANGE UP (ref 10.3–16.9)
SODIUM SERPL-SCNC: 121 MMOL/L — LOW (ref 135–145)
SODIUM SERPL-SCNC: 122 MMOL/L — LOW (ref 135–145)
SODIUM SERPL-SCNC: 124 MMOL/L — LOW (ref 135–145)
SODIUM UR-SCNC: 113 MMOL/L — SIGNIFICANT CHANGE UP
SODIUM UR-SCNC: 56 MMOL/L — SIGNIFICANT CHANGE UP
TOTAL CHOLESTEROL/HDL RATIO MEASUREMENT: 3.3 RATIO — LOW (ref 3.4–9.6)
TRIGL SERPL-MCNC: 72 MG/DL — SIGNIFICANT CHANGE UP (ref 10–149)
WBC # BLD: 9.7 K/UL — SIGNIFICANT CHANGE UP (ref 3.8–10.5)
WBC # FLD AUTO: 9.7 K/UL — SIGNIFICANT CHANGE UP (ref 3.8–10.5)

## 2017-07-14 PROCEDURE — 93306 TTE W/DOPPLER COMPLETE: CPT | Mod: 26

## 2017-07-14 PROCEDURE — 93010 ELECTROCARDIOGRAM REPORT: CPT

## 2017-07-14 PROCEDURE — 99233 SBSQ HOSP IP/OBS HIGH 50: CPT

## 2017-07-14 PROCEDURE — 99254 IP/OBS CNSLTJ NEW/EST MOD 60: CPT

## 2017-07-14 PROCEDURE — 71010: CPT | Mod: 26

## 2017-07-14 RX ORDER — SODIUM CHLORIDE 5 G/100ML
50 INJECTION, SOLUTION INTRAVENOUS
Qty: 0 | Refills: 0 | Status: DISCONTINUED | OUTPATIENT
Start: 2017-07-14 | End: 2017-07-14

## 2017-07-14 RX ORDER — AZITHROMYCIN 500 MG/1
500 TABLET, FILM COATED ORAL EVERY 24 HOURS
Qty: 0 | Refills: 0 | Status: DISCONTINUED | OUTPATIENT
Start: 2017-07-14 | End: 2017-07-15

## 2017-07-14 RX ORDER — SODIUM CHLORIDE 5 G/100ML
500 INJECTION, SOLUTION INTRAVENOUS
Qty: 0 | Refills: 0 | Status: DISCONTINUED | OUTPATIENT
Start: 2017-07-14 | End: 2017-07-15

## 2017-07-14 RX ORDER — CEFTRIAXONE 500 MG/1
1 INJECTION, POWDER, FOR SOLUTION INTRAMUSCULAR; INTRAVENOUS EVERY 24 HOURS
Qty: 0 | Refills: 0 | Status: DISCONTINUED | OUTPATIENT
Start: 2017-07-14 | End: 2017-07-15

## 2017-07-14 RX ORDER — SODIUM CHLORIDE 5 G/100ML
25 INJECTION, SOLUTION INTRAVENOUS
Qty: 0 | Refills: 0 | Status: DISCONTINUED | OUTPATIENT
Start: 2017-07-14 | End: 2017-07-14

## 2017-07-14 RX ADMIN — AZITHROMYCIN 255 MILLIGRAM(S): 500 TABLET, FILM COATED ORAL at 15:06

## 2017-07-14 RX ADMIN — HEPARIN SODIUM 5000 UNIT(S): 5000 INJECTION INTRAVENOUS; SUBCUTANEOUS at 13:20

## 2017-07-14 RX ADMIN — Medication 25 MILLIGRAM(S): at 06:04

## 2017-07-14 RX ADMIN — Medication 1000 UNIT(S): at 13:20

## 2017-07-14 RX ADMIN — HEPARIN SODIUM 5000 UNIT(S): 5000 INJECTION INTRAVENOUS; SUBCUTANEOUS at 06:04

## 2017-07-14 RX ADMIN — PANTOPRAZOLE SODIUM 40 MILLIGRAM(S): 20 TABLET, DELAYED RELEASE ORAL at 06:05

## 2017-07-14 RX ADMIN — LISINOPRIL 40 MILLIGRAM(S): 2.5 TABLET ORAL at 06:04

## 2017-07-14 RX ADMIN — HEPARIN SODIUM 5000 UNIT(S): 5000 INJECTION INTRAVENOUS; SUBCUTANEOUS at 22:08

## 2017-07-14 RX ADMIN — ATORVASTATIN CALCIUM 20 MILLIGRAM(S): 80 TABLET, FILM COATED ORAL at 22:08

## 2017-07-14 RX ADMIN — SPIRONOLACTONE 25 MILLIGRAM(S): 25 TABLET, FILM COATED ORAL at 06:04

## 2017-07-14 RX ADMIN — MAGNESIUM OXIDE 400 MG ORAL TABLET 400 MILLIGRAM(S): 241.3 TABLET ORAL at 13:20

## 2017-07-14 RX ADMIN — CLOPIDOGREL BISULFATE 75 MILLIGRAM(S): 75 TABLET, FILM COATED ORAL at 13:20

## 2017-07-14 RX ADMIN — CEFTRIAXONE 100 GRAM(S): 500 INJECTION, POWDER, FOR SOLUTION INTRAMUSCULAR; INTRAVENOUS at 15:05

## 2017-07-14 NOTE — PROGRESS NOTE ADULT - SUBJECTIVE AND OBJECTIVE BOX
I examined the patient today, reviewed the lab data, xrays and additional studies. Additionally I have reviewed the notes and assessments by the residents and consultants.   At this point I agree with the assessments and plan.  I would also advise close observation, and supportive care.

## 2017-07-14 NOTE — PROGRESS NOTE ADULT - PROBLEM SELECTOR PLAN 1
- tx Ceftriaxone + Azithromycin for CAP  - Urine Legionella negative  - f/u Mycoplasma, Strep serology  - Blood no growth to date   - pulmonary on board, f/u rec's   - monitor respiratory status

## 2017-07-14 NOTE — PROGRESS NOTE ADULT - PROBLEM SELECTOR PLAN 3
Troponin negative x 1  EKG with no signs of ischemia  Echo showed of EF 65%. -Troponin negative x 1  -EKG with no signs of ischemia  -Echo showed of EF 65%.  -atorvastatin 20mg Oral at bedtime  -metoprolol succinate ER 25 mg oral daily

## 2017-07-14 NOTE — PROGRESS NOTE ADULT - PROBLEM SELECTOR PLAN 2
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

## 2017-07-14 NOTE — CONSULT NOTE ADULT - PROBLEM SELECTOR RECOMMENDATION 9
- last sodium 121   - we will recommed hypertonic 3% NaCl 25 cc/h for 2 hours. AFTER 2 hours stop the infusion   - check the serum 2-3 hours after that   - we will follow up in the morning   -
He has a clinical presentation of Pneumonia and CT imaging showing  RML and RLL peribronchovascular interstitial PNA with reticulonodular appearance with some pulmonary nodules in RML measuring 1.2 cm.(Image couldn't be reviewed as we have only report). We would have to review it to r/o possible underlying malignancy, other ILD with peribronchovascular distribution.  -Requested the daughter to bring us the copy of CD for review.   -Agree with CAP coverage with ceftriaxone and Azithromycin.  -Check urine legionella AG(possibility with hyponatremia), check blood strept Ag, mycoplasma pneumoniae IGG/IGM, Respiratory viral panel  -Blood culture, sputum culture  -Please send procalcitonin  -Cardiac work up for the chest pain sofar negative(Normal Trops and ECG). Cardiology work up and evaluation in process.  -We will need to have a follow up CT to evaluate for resolution of consolidation and nodules reported , to r/o underlying malignancy or other ILD(though the unilateral lesion speaks against it).  -PSI score 108 Risk Class IV, 8.2-9.3% mortality. Hospitalization recommended based on risk..

## 2017-07-14 NOTE — CONSULT NOTE ADULT - SUBJECTIVE AND OBJECTIVE BOX
87 y/o M w/ pmhx of CAD s/p CABG (19 yrs ago @ [place]), subdural hematoma s/p evacuation, CVA (no residual defecits),. Now admitted for CAP, over the hospital stay noticed that he has hyponatremia 125 serum sodium. The patient was consulted with dr. Sheehan who recommended fluid restriction and follow up the labs. Over the day the sodium trended down to 121, and according to the team the patient has an episode of confusion for a couple of seconds. I personally talked to the patient - he is completely oriented for time place and personality around 1000 pm, no headache, no nausea or vomiting   No other complains - accept for cough      Patient is a 88y Male admitted for     PAST MEDICAL & SURGICAL HISTORY:  HTN (hypertension)  CVA (cerebral vascular accident)  CAD (coronary artery disease)  No significant past surgical history      MEDICATIONS  (STANDING):  lisinopril 40 milliGRAM(s) Oral daily  clopidogrel Tablet 75 milliGRAM(s) Oral daily  atorvastatin 20 milliGRAM(s) Oral at bedtime  metoprolol succinate ER 25 milliGRAM(s) Oral daily  pantoprazole    Tablet 40 milliGRAM(s) Oral before breakfast  magnesium oxide 400 milliGRAM(s) Oral daily  cholecalciferol 1000 Unit(s) Oral daily  heparin  Injectable 5000 Unit(s) SubCutaneous every 8 hours  cefTRIAXone   IVPB 1 Gram(s) IV Intermittent every 24 hours  azithromycin  IVPB 500 milliGRAM(s) IV Intermittent every 24 hours  sodium chloride 3%. 50 milliLiter(s) (25 mL/Hr) IV Continuous <Continuous>    MEDICATIONS  (PRN):  acetaminophen   Tablet 650 milliGRAM(s) Oral every 6 hours PRN For Temp greater than 38 C (100.4 F)      Allergies    No Known Allergies    Intolerances        SOCIAL HISTORY:    FAMILY HISTORY:      T(C): , Max: 36.9 (07-14-17 @ 06:21)  T(F): , Max: 98.4 (07-14-17 @ 06:21)  HR: 62 (07-14-17 @ 21:05)  BP: 152/78 (07-14-17 @ 21:05)  BP(mean): 113 (07-14-17 @ 19:32)  RR: 18 (07-14-17 @ 21:05)  SpO2: 95% (07-14-17 @ 21:05)  Wt(kg): --    07-13 @ 07:01  -  07-14 @ 07:00  --------------------------------------------------------  IN: 0 mL / OUT: 550 mL / NET: -550 mL    07-14 @ 07:01  -  07-14 @ 22:26  --------------------------------------------------------  IN: 790 mL / OUT: 475 mL / NET: 315 mL        Physical exam;   Alert and oriented  Tongue moist   No JVD  Pupils reactive to light   Lungs: normal air entry bilaterally, mild wheezing, no crackles   Heart: rrr, normal s1/s2, no murmurs, rubs or gallops   Abdomen: soft, non tender, non distended, normal bowel sounds   Extremeties: no peripehral edemea    LABS:                        13.6   9.7   )-----------( 198      ( 14 Jul 2017 08:19 )             38.0     07-14    121<L>  |  86<L>  |  13  ----------------------------<  119<H>  4.1   |  20<L>  |  1.00    Ca    8.6      14 Jul 2017 21:24  Phos  2.4     07-14    TPro  6.9  /  Alb  3.7  /  TBili  0.8  /  DBili  x   /  AST  25  /  ALT  15  /  AlkPhos  70  07-13    Osmolality, Serum: 257 mosm/kg <L> [280 - 301] (07-14 @ 15:28)  Cholesterol, Serum: 117 mg/dL [10 - 199] (07-14 @ 08:19)        Osmolality, Random Urine: 510 mosmol/kg (07-14 @ 11:33)  Sodium, Random Urine: 113 mmol/L (07-14 @ 11:33)  Sodium, Random Urine: 56 mmol/L (07-14 @ 06:52)  Creatinine, Random Urine: 57 mg/dL (07-14 @ 06:52)        RADIOLOGY & ADDITIONAL STUDIES:
HPI: 88M presenting w/ 10 days of generalized weakness and cough, measured temperature of 100 at home but no chills.  No further episodes of fever at home.  Patient endorses associated thick yellow sputum and dysphonia.  No associated headaches, facial pain, nasal drainage, post-nasal drip, odynophagia, or dysphagia.  No hx of sinusitis or sinus surgery.  Patient does endorse hx of seasonal allergies.  Patient currently being tx for influenza virus and community acquired pneumonia, ENT consulted to r/o sinusitis.    PMH: CAD s/p CABG, CVA w/o residual deficits, HTN, allergic rhinitis    PE:  Gen: NAD, alert and responsive  Resp: non-labored breathing on RA, no stridor, no tachypnea, soft voice  Face: no facial tenderness to palpation, HB 1/6, no overlying skin changes, EOMI  Nose: no nasal drainage, septum midline  OC/OP: healthy appearing oral mucosa, no tonsillar or pharyngeal exudates or erythema  Neck: supple, no LAD  NPL: nasal mucosa anesthetized and decongested with 4% lidocaine and afrin w/ good effect.  Minimal nasal drainage removed with suction, head of middle turbinate with polypoid changes b/l, middle meatus open b/l, no purulence visualized, np/op/hp clear, TVC mobile bilaterally w/o masses or lesions, vallecula clear, epiglottis w/ sharp margins, no reflux changes, airway patent    No imaging of sinuses available    A/P:  88M w/ community acquired pneumonia and no symptoms or PE finding consistent with acute rhinosinusitis but consistent with chronic allergic changes and hx of anti-histamine nasal spray use  - recommend flonase 1 spray, each nostril daily  - f/u with Dr. Richey after discharge  - Call ENT with questions  - Rest of care per primary team  - Discussed with attending Dr. Richey
Patient is a 88y old  Male who presents with a chief complaint of SOB and cough X 1 week    HPI:  An 87yo M hx of CAD s/p CABG on ASA/plavix, subdural hematoma s/p evacuation, hx stroke no residual, presenting for cough of one week duration with associated progressive SOB.  Cough is productive of yellowish sputum, and complained of nasal congestion.  He complained of chest pain at rest. He has associated fever and chills. Denied any previous Hx of PNA or pulmonary disease. CT chest from outside reported a RML and RLL peribhronchovascular interstitial PNA and pulmonary nodules.  He denied any smoking history. Vaccination is uptodate including pneumococcal. Denied weight loss or night sweat. Denies any exposure to mold, birds/exotic pets, or toxic inhalation.    REVIEW OF SYSTEMS:  Constitutional: Had fever, fatigue  Eyes: No eye pain, visual disturbances, or discharge  ENMT: Positive for sinus or throat pain. No difficulty hearing, tinnitus, vertigo;   Neck: No pain, stiffness or neck swelling  Respiratory: See HPI  Cardiovascular: No chest pain, palpitations, dizziness or leg swelling  Gastrointestinal: No abdominal or epigastric pain. No nausea, vomiting or hematemesis; No diarrhea or constipation. No melena or hematochezia.  Genitourinary: No dysuria, frequency, hematuria or incontinence  Neurological: No headaches, memory loss, loss of strength, numbness or tremors  Skin: No itching, burning, rashes or lesions   Endocrine: No heat or cold intolerance; No hair loss  Musculoskeletal: No joint pain or swelling; No muscle, back or extremity pain  Psychiatric: No depression, anxiety, mood swings or difficulty sleeping    PAST MEDICAL & SURGICAL HISTORY:  HTN (hypertension)  CVA (cerebral vascular accident)  CAD (coronary artery disease)  No significant past surgical history      FAMILY HISTORY:      SOCIAL HISTORY:  Smoking Status: [ ] Current, [ ] Former, [X ] Never    MEDICATIONS:  Cef/Azithro    Allergies    No Known Allergies    Intolerances        Vital Signs Last 24 Hrs  T(C): 36.7 (13 Jul 2017 13:55), Max: 36.7 (13 Jul 2017 13:55)  T(F): 98 (13 Jul 2017 13:55), Max: 98 (13 Jul 2017 13:55)  HR: 65 (13 Jul 2017 13:55) (65 - 65)  BP: 159/96 (13 Jul 2017 13:55) (159/96 - 159/96)  BP(mean): --  RR: 18 (13 Jul 2017 13:55) (18 - 18)  SpO2: 100% (13 Jul 2017 13:55) (100% - 100%)        PHYSICAL EXAM:    General: Well developed; well nourished; in no acute distress  Eyes: PERRL, EOM intact; conjunctiva and sclera clear  Head: Normocephalic; atraumatic  ENMT: No nasal discharge; airway clear  Neck: Supple; non tender; no masses  Respiratory: Mildly decreased breath sound in right basal, otherwise clear to auscultation bilaterally without wheezing, rhonchi or rales  Cardiovascular: Regular rate and rhythm. S1 and S2 Normal; No murmurs, gallops or rubs  Gastrointestinal: Soft non-tender non-distended; Normal bowel sounds; No hepatosplenomegaly  Genitourinary: No costovertebral angle tenderness  Extremities: Normal range of motion, No clubbing, cyanosis or edema  Vascular: Peripheral pulses palpable 2+ bilaterally  Neurological: Alert and oriented x3  Skin: Warm and dry. No obvious rash  Lymph Nodes: No acute cervical or supraclavicular adenopathy  Musculoskeletal: Normal gait, tone, without deformities      LABS:      CBC Full  -  ( 13 Jul 2017 17:06 )  WBC Count : 10.0 K/uL  Hemoglobin : 13.8 g/dL  Hematocrit : 39.7 %  Platelet Count - Automated : 196 K/uL  Mean Cell Volume : 84.3 fL  Mean Cell Hemoglobin : 29.3 pg  Mean Cell Hemoglobin Concentration : 34.8 g/dL  Auto Neutrophil # : x  Auto Lymphocyte # : x  Auto Monocyte # : x  Auto Eosinophil # : x  Auto Basophil # : x  Auto Neutrophil % : 82.2 %  Auto Lymphocyte % : 9.0 %  Auto Monocyte % : 8.2 %  Auto Eosinophil % : 0.5 %  Auto Basophil % : 0.1 %    07-13    125<L>  |  88<L>  |  18  ----------------------------<  95  4.5   |  20<L>  |  1.10    Ca    9.0      13 Jul 2017 17:06    TPro  6.9  /  Alb  3.7  /  TBili  0.8  /  DBili  x   /  AST  25  /  ALT  15  /  AlkPhos  70  07-13        RADIOLOGY & ADDITIONAL STUDIES (The following images were personally reviewed):

## 2017-07-14 NOTE — PROGRESS NOTE ADULT - SUBJECTIVE AND OBJECTIVE BOX
SUBJECTIVE: Patient seen and examined at bedside. He states he is feeling better this morning from last night . +cough with yellow sputum/clear/brown sputum, neg for fevers, chills     OBJECTIVE:  VITAL SIGNS:  ICU Vital Signs Last 24 Hrs  T(C): 36.3 (2017 16:58), Max: 36.9 (2017 06:21)  T(F): 97.3 (2017 16:58), Max: 98.4 (2017 06:21)  HR: 62 (2017 21:05) (54 - 73)  BP: 152/78 (2017 21:05) (137/63 - 162/64)  BP(mean): 113 (2017 19:32) (90 - 113)  ABP: --  ABP(mean): --  RR: 18 (2017 21:05) (16 - 18)  SpO2: 95% (2017 21:05) (95% - 98%)        13 @ 07:  -   @ 07:00  --------------------------------------------------------  IN: 0 mL / OUT: 550 mL / NET: -550 mL     @ 07:  -   @ 23:12  --------------------------------------------------------  IN: 790 mL / OUT: 475 mL / NET: 315 mL      CAPILLARY BLOOD GLUCOSE  103 (2017 22:01)    PHYSICAL EXAM:  General: NAD, sitting up in bed comfortably   HEENT: NC/AT, +nasal discharge   Neck: supple, nontender   Respiratory:   Cardiovascular: +S1/S2; RRR  Abdomen: soft, NT/ND; +BS x4  Extremities: WWP, 2+ peripheral pulses b/l; no LE edema    Neurological: No focal deficits     MEDICATIONS:  MEDICATIONS  (STANDING):  lisinopril 40 milliGRAM(s) Oral daily  clopidogrel Tablet 75 milliGRAM(s) Oral daily  atorvastatin 20 milliGRAM(s) Oral at bedtime  metoprolol succinate ER 25 milliGRAM(s) Oral daily  pantoprazole    Tablet 40 milliGRAM(s) Oral before breakfast  magnesium oxide 400 milliGRAM(s) Oral daily  cholecalciferol 1000 Unit(s) Oral daily  heparin  Injectable 5000 Unit(s) SubCutaneous every 8 hours  cefTRIAXone   IVPB 1 Gram(s) IV Intermittent every 24 hours  azithromycin  IVPB 500 milliGRAM(s) IV Intermittent every 24 hours  sodium chloride 3%. 25 milliLiter(s) (25 mL/Hr) IV Continuous <Continuous>    MEDICATIONS  (PRN):  acetaminophen   Tablet 650 milliGRAM(s) Oral every 6 hours PRN For Temp greater than 38 C (100.4 F)      ALLERGIES:  Allergies    No Known Allergies    Intolerances        LABS:                        13.6   9.7   )-----------( 198      ( 2017 08:19 )             38.0     07-14    121<L>  |  86<L>  |  13  ----------------------------<  119<H>  4.1   |  20<L>  |  1.00    Ca    8.6      2017 21:24  Phos  2.4     07-14    TPro  6.9  /  Alb  3.7  /  TBili  0.8  /  DBili  x   /  AST  25  /  ALT  15  /  AlkPhos  70  07-13          RADIOLOGY & ADDITIONAL TESTS: Reviewed.    EXAM:  XR CHEST 1 VIEW PORT ROUTINE                          PROCEDURE DATE:  2017                     INTERPRETATION:  CLINICAL INFORMATION:  SOB.    TECHNIQUE: A frontal view of the chest is dated 2017 5:51 AM.    COMPARISON: None available    FINDINGS: Minimal left basilar linear segmental atelectatic changes.   There is no focal consolidation, pneumothorax, or pleural effusion.    Although evaluation is limited on AP view, the cardiac silhouette appears   normal in size.  Patient is status post median sternotomy and CABG.    IMPRESSION: No focal infiltrates.    EKG  Ventricular Rate 55 BPM    Atrial Rate 55 BPM    P-R Interval 150 ms    QRS Duration 102 ms    Q-T Interval 442 ms    QTC Calculation(Bezet) 422 ms    P Axis 34 degrees    R Axis -2 degrees    T Axis 79 degrees    Diagnosis Line Sinus bradycardia  Possible Left atrial enlargement  Inferior infarct , age undetermined  Nonspecific ST - T abnormalities    EXAM:  ECHOCARDIOGRAM (CARDIOL)                          PROCEDURE DATE:  2017                        INTERPRETATION:  Patient Height: 172.7 cm  Patient Weight: 75.3 kg  Heart Rate: 66 bpm  Systolic Pressure: 141 mmHg  Diastolic Pressure: 65 mmHg  BSA: 1.9 m^2  Interpretation Summary  Left ventricular hypertrophy presentThe left ventricular wall motion is   normal.The left ventricular ejection fraction is estimated to be   60-65%The   left atrium is mildly dilated.Right atrial size is normal.The right   ventricle   is normal in size and function.There is mild aortic valve thickening.No   aortic   regurgitation noted.Structurally normal mitral valve.There is trace   mitral   regurgitation.Structurally normal tricuspid valve.There is no   echocardiographic evidence for pulmonary hypertension.Mild aortic root   dilatation.The aortic root measures 4.5 cm at sinuses (normal less than   4 cm   for men, less than 3.6 cm for women).  The aortic root measures 2.3   cm/m2 at   sinuses (normal less than 2.1 cm/m2 for men, less than 2.2 cm/m2 for   women).   There is no pericardial effusion.  Procedure Details  A complete two-dimensional transthoracic echocardiogram was performed (2D,  M-mode, spectral and color flow doppler).  Study Quality: Good.  Left Ventricle  Left ventricular hypertrophy present  The left ventricular wall motion is normal.  The left ventricular ejection fraction is estimated to be 60-65%  Left Atrium  The left atrium is mildly dilated.  Right Atrium  Right atrial size is normal.  Right Ventricle  The right ventricle is normal in size and function.  Aortic Valve  There is mild aortic valve thickening.  No aortic regurgitation noted.  Mitral Valve  Structurally normal mitral valve.  There is trace mitral regurgitation.  Tricuspid Valve  Structurally normal tricuspid valve.  There is trace tricuspid regurgitation.  There is no echocardiographic evidence for pulmonary hypertension.  Arteries and Venous System  Mild aortic root dilatation.  The aortic root measures 4.5 cm at sinuses (normal less than 4 cm for men,  less than 3.6 cm for women).  The aortic root measures 2.3 cm/m2 at sinuses (normal less than 2.1 cm/m2   for  men, less than 2.2 cm/m2 for women).  Pericardium / Pleura  There is no pericardial effusion.  Doppler Measurements & Calculations  MV E point: 89.3 cm/sec  MV A point: 99.7 cm/sec  MV E/A: 0.9  MV dec time: 0.3 sec  Ao V2 max: 168.3 cm/sec  Ao max P.3 mmHg  Ao max PG (full): 6.0 mmHg  THUY(V,A): 2.1 cm^2  THUY(V,D): 2.1 cm^2  AI max katarzyna: 422.9 cm/sec  AI max P.5 mmHg  AI dec slope: 231.3 cm/sec^2  AI P1/2t: 535.7 msec  LV max P.3 mmHg  LV mean PG: 3.0 mmHg  LV V1 max: 115.5 cm/sec  LV V1 mean: 79.9 cm/sec  LV V1 VTI: 31.5 cm  SV(LVOT): 95.8 ml  SI(LVOT): 50.7 ml/m^2  TR Max katarzyna: 248.4 cm/sec  MMode 2D Measurements & Calculations  IVSd: 1.8 cm  LVIDd: 3.9 cm  LVPWd: 1.3 cm  IVS/LVPW: 1.3  EDV(Teich): 67.8 ml  LV mass(C)d: 237.1 grams  LV mass(C)dI: 125.6 grams/m^2  Ao root diam: 4.5 cm  Ao root area: 15.7 cm^2  LA dimension: 3.2 cm  asc Aorta: 4.1 cm  LA/Ao: 0.7  LVOT diam: 2.0 cm  LVOT area: 3.0 cm^2  LVOT area (M): 3.1 cm^2  LVLd ap4: 7.2 cm  EDV(MOD-sp4): 97 ml  LVLs ap4: 6.2 cm  ESV(MOD-sp4): 33 ml  EF(MOD-sp4): 66.0 %  SV(MOD-sp4): 64 ml  SI(MOD-sp4): 33.9 ml/m^2  Interpreting Physician:Costa Kay MD electronically signed on   2017 12:53:07 SUBJECTIVE: Patient seen and examined at bedside. He states he is feeling better this morning from last night . +cough with yellow sputum/clear/brown sputum, neg for fevers, chills     OBJECTIVE:  VITAL SIGNS:  ICU Vital Signs Last 24 Hrs  T(C): 36.3 (2017 16:58), Max: 36.9 (2017 06:21)  T(F): 97.3 (2017 16:58), Max: 98.4 (2017 06:21)  HR: 62 (2017 21:05) (54 - 73)  BP: 152/78 (2017 21:05) (137/63 - 162/64)  BP(mean): 113 (2017 19:32) (90 - 113)  ABP: --  ABP(mean): --  RR: 18 (2017 21:05) (16 - 18)  SpO2: 95% (2017 21:05) (95% - 98%)        13 @ 07:  -   @ 07:00  --------------------------------------------------------  IN: 0 mL / OUT: 550 mL / NET: -550 mL     @ 07:  -   @ 23:12  --------------------------------------------------------  IN: 790 mL / OUT: 475 mL / NET: 315 mL      CAPILLARY BLOOD GLUCOSE  103 (2017 22:01)    PHYSICAL EXAM:  General: NAD, sitting up in bed comfortably   HEENT: NC/AT, +nasal discharge   Neck: supple, nontender   Respiratory: Lungs clear to auscultation, + egophony   Cardiovascular: +S1/S2; RRR  Abdomen: soft, NT/ND; +BS x4  Extremities: + trace edema     Neurological: No focal deficits     MEDICATIONS:  MEDICATIONS  (STANDING):  lisinopril 40 milliGRAM(s) Oral daily  clopidogrel Tablet 75 milliGRAM(s) Oral daily  atorvastatin 20 milliGRAM(s) Oral at bedtime  metoprolol succinate ER 25 milliGRAM(s) Oral daily  pantoprazole    Tablet 40 milliGRAM(s) Oral before breakfast  magnesium oxide 400 milliGRAM(s) Oral daily  cholecalciferol 1000 Unit(s) Oral daily  heparin  Injectable 5000 Unit(s) SubCutaneous every 8 hours  cefTRIAXone   IVPB 1 Gram(s) IV Intermittent every 24 hours  azithromycin  IVPB 500 milliGRAM(s) IV Intermittent every 24 hours  sodium chloride 3%. 25 milliLiter(s) (25 mL/Hr) IV Continuous <Continuous>    MEDICATIONS  (PRN):  acetaminophen   Tablet 650 milliGRAM(s) Oral every 6 hours PRN For Temp greater than 38 C (100.4 F)      ALLERGIES:  Allergies    No Known Allergies    Intolerances        LABS:                        13.6   9.7   )-----------( 198      ( 2017 08:19 )             38.0     07-14    121<L>  |  86<L>  |  13  ----------------------------<  119<H>  4.1   |  20<L>  |  1.00    Ca    8.6      2017 21:24  Phos  2.4     07-14    TPro  6.9  /  Alb  3.7  /  TBili  0.8  /  DBili  x   /  AST  25  /  ALT  15  /  AlkPhos  70  07-13          RADIOLOGY & ADDITIONAL TESTS: Reviewed.    EXAM:  XR CHEST 1 VIEW PORT ROUTINE                          PROCEDURE DATE:  2017                     INTERPRETATION:  CLINICAL INFORMATION:  SOB.    TECHNIQUE: A frontal view of the chest is dated 2017 5:51 AM.    COMPARISON: None available    FINDINGS: Minimal left basilar linear segmental atelectatic changes.   There is no focal consolidation, pneumothorax, or pleural effusion.    Although evaluation is limited on AP view, the cardiac silhouette appears   normal in size.  Patient is status post median sternotomy and CABG.    IMPRESSION: No focal infiltrates.    EKG  Ventricular Rate 55 BPM    Atrial Rate 55 BPM    P-R Interval 150 ms    QRS Duration 102 ms    Q-T Interval 442 ms    QTC Calculation(Bezet) 422 ms    P Axis 34 degrees    R Axis -2 degrees    T Axis 79 degrees    Diagnosis Line Sinus bradycardia  Possible Left atrial enlargement  Inferior infarct , age undetermined  Nonspecific ST - T abnormalities    EXAM:  ECHOCARDIOGRAM (CARDIOL)                          PROCEDURE DATE:  2017                        INTERPRETATION:  Patient Height: 172.7 cm  Patient Weight: 75.3 kg  Heart Rate: 66 bpm  Systolic Pressure: 141 mmHg  Diastolic Pressure: 65 mmHg  BSA: 1.9 m^2  Interpretation Summary  Left ventricular hypertrophy presentThe left ventricular wall motion is   normal.The left ventricular ejection fraction is estimated to be   60-65%The   left atrium is mildly dilated.Right atrial size is normal.The right   ventricle   is normal in size and function.There is mild aortic valve thickening.No   aortic   regurgitation noted.Structurally normal mitral valve.There is trace   mitral   regurgitation.Structurally normal tricuspid valve.There is no   echocardiographic evidence for pulmonary hypertension.Mild aortic root   dilatation.The aortic root measures 4.5 cm at sinuses (normal less than   4 cm   for men, less than 3.6 cm for women).  The aortic root measures 2.3   cm/m2 at   sinuses (normal less than 2.1 cm/m2 for men, less than 2.2 cm/m2 for   women).   There is no pericardial effusion.  Procedure Details  A complete two-dimensional transthoracic echocardiogram was performed (2D,  M-mode, spectral and color flow doppler).  Study Quality: Good.  Left Ventricle  Left ventricular hypertrophy present  The left ventricular wall motion is normal.  The left ventricular ejection fraction is estimated to be 60-65%  Left Atrium  The left atrium is mildly dilated.  Right Atrium  Right atrial size is normal.  Right Ventricle  The right ventricle is normal in size and function.  Aortic Valve  There is mild aortic valve thickening.  No aortic regurgitation noted.  Mitral Valve  Structurally normal mitral valve.  There is trace mitral regurgitation.  Tricuspid Valve  Structurally normal tricuspid valve.  There is trace tricuspid regurgitation.  There is no echocardiographic evidence for pulmonary hypertension.  Arteries and Venous System  Mild aortic root dilatation.  The aortic root measures 4.5 cm at sinuses (normal less than 4 cm for men,  less than 3.6 cm for women).  The aortic root measures 2.3 cm/m2 at sinuses (normal less than 2.1 cm/m2   for  men, less than 2.2 cm/m2 for women).  Pericardium / Pleura  There is no pericardial effusion.  Doppler Measurements & Calculations  MV E point: 89.3 cm/sec  MV A point: 99.7 cm/sec  MV E/A: 0.9  MV dec time: 0.3 sec  Ao V2 max: 168.3 cm/sec  Ao max P.3 mmHg  Ao max PG (full): 6.0 mmHg  THUY(V,A): 2.1 cm^2  THUY(V,D): 2.1 cm^2  AI max katarzyna: 422.9 cm/sec  AI max P.5 mmHg  AI dec slope: 231.3 cm/sec^2  AI P1/2t: 535.7 msec  LV max P.3 mmHg  LV mean PG: 3.0 mmHg  LV V1 max: 115.5 cm/sec  LV V1 mean: 79.9 cm/sec  LV V1 VTI: 31.5 cm  SV(LVOT): 95.8 ml  SI(LVOT): 50.7 ml/m^2  TR Max katarzyna: 248.4 cm/sec  MMode 2D Measurements & Calculations  IVSd: 1.8 cm  LVIDd: 3.9 cm  LVPWd: 1.3 cm  IVS/LVPW: 1.3  EDV(Teich): 67.8 ml  LV mass(C)d: 237.1 grams  LV mass(C)dI: 125.6 grams/m^2  Ao root diam: 4.5 cm  Ao root area: 15.7 cm^2  LA dimension: 3.2 cm  asc Aorta: 4.1 cm  LA/Ao: 0.7  LVOT diam: 2.0 cm  LVOT area: 3.0 cm^2  LVOT area (M): 3.1 cm^2  LVLd ap4: 7.2 cm  EDV(MOD-sp4): 97 ml  LVLs ap4: 6.2 cm  ESV(MOD-sp4): 33 ml  EF(MOD-sp4): 66.0 %  SV(MOD-sp4): 64 ml  SI(MOD-sp4): 33.9 ml/m^2  Interpreting Physician:Costa aKy MD electronically signed on   2017 12:53:07

## 2017-07-14 NOTE — PROGRESS NOTE ADULT - ASSESSMENT
89 y/o M w/ Pmhx of CAD s/p CABG (19 yrs ago), subdural hematoma s/p evacuation, CVA (no residual deficits), presents with SOB and cough x 1 week admitted with community acquired pneumonia

## 2017-07-14 NOTE — CONSULT NOTE ADULT - ASSESSMENT
This is 88 year old male with past medical history stated above. 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
An 89yo M hx of CAD s/p CABG on ASA/plavix, subdural hematoma s/p evacuation, hx stroke no residual, presenting for cough of one week duration with associated progressive SOB, productive cough and fever.

## 2017-07-15 VITALS
RESPIRATION RATE: 17 BRPM | OXYGEN SATURATION: 95 % | HEART RATE: 60 BPM | SYSTOLIC BLOOD PRESSURE: 129 MMHG | DIASTOLIC BLOOD PRESSURE: 57 MMHG

## 2017-07-15 DIAGNOSIS — I15.9 SECONDARY HYPERTENSION, UNSPECIFIED: ICD-10-CM

## 2017-07-15 LAB
ANION GAP SERPL CALC-SCNC: 13 MMOL/L — SIGNIFICANT CHANGE UP (ref 5–17)
ANION GAP SERPL CALC-SCNC: 13 MMOL/L — SIGNIFICANT CHANGE UP (ref 5–17)
ANION GAP SERPL CALC-SCNC: 14 MMOL/L — SIGNIFICANT CHANGE UP (ref 5–17)
BUN SERPL-MCNC: 13 MG/DL — SIGNIFICANT CHANGE UP (ref 7–23)
BUN SERPL-MCNC: 14 MG/DL — SIGNIFICANT CHANGE UP (ref 7–23)
BUN SERPL-MCNC: 15 MG/DL — SIGNIFICANT CHANGE UP (ref 7–23)
CALCIUM SERPL-MCNC: 8.7 MG/DL — SIGNIFICANT CHANGE UP (ref 8.4–10.5)
CALCIUM SERPL-MCNC: 8.9 MG/DL — SIGNIFICANT CHANGE UP (ref 8.4–10.5)
CALCIUM SERPL-MCNC: 9.2 MG/DL — SIGNIFICANT CHANGE UP (ref 8.4–10.5)
CHLORIDE SERPL-SCNC: 89 MMOL/L — LOW (ref 96–108)
CHLORIDE SERPL-SCNC: 89 MMOL/L — LOW (ref 96–108)
CHLORIDE SERPL-SCNC: 90 MMOL/L — LOW (ref 96–108)
CO2 SERPL-SCNC: 21 MMOL/L — LOW (ref 22–31)
CO2 SERPL-SCNC: 22 MMOL/L — SIGNIFICANT CHANGE UP (ref 22–31)
CO2 SERPL-SCNC: 24 MMOL/L — SIGNIFICANT CHANGE UP (ref 22–31)
CREAT SERPL-MCNC: 1 MG/DL — SIGNIFICANT CHANGE UP (ref 0.5–1.3)
CREAT SERPL-MCNC: 1 MG/DL — SIGNIFICANT CHANGE UP (ref 0.5–1.3)
CREAT SERPL-MCNC: 1.1 MG/DL — SIGNIFICANT CHANGE UP (ref 0.5–1.3)
GLUCOSE SERPL-MCNC: 111 MG/DL — HIGH (ref 70–99)
GLUCOSE SERPL-MCNC: 116 MG/DL — HIGH (ref 70–99)
GLUCOSE SERPL-MCNC: 130 MG/DL — HIGH (ref 70–99)
HCT VFR BLD CALC: 38 % — LOW (ref 39–50)
HGB BLD-MCNC: 13.6 G/DL — SIGNIFICANT CHANGE UP (ref 13–17)
MAGNESIUM SERPL-MCNC: 1.9 MG/DL — SIGNIFICANT CHANGE UP (ref 1.6–2.6)
MCHC RBC-ENTMCNC: 30 PG — SIGNIFICANT CHANGE UP (ref 27–34)
MCHC RBC-ENTMCNC: 35.8 G/DL — SIGNIFICANT CHANGE UP (ref 32–36)
MCV RBC AUTO: 83.7 FL — SIGNIFICANT CHANGE UP (ref 80–100)
PLATELET # BLD AUTO: 242 K/UL — SIGNIFICANT CHANGE UP (ref 150–400)
POTASSIUM SERPL-MCNC: 4.5 MMOL/L — SIGNIFICANT CHANGE UP (ref 3.5–5.3)
POTASSIUM SERPL-MCNC: 4.6 MMOL/L — SIGNIFICANT CHANGE UP (ref 3.5–5.3)
POTASSIUM SERPL-MCNC: 4.7 MMOL/L — SIGNIFICANT CHANGE UP (ref 3.5–5.3)
POTASSIUM SERPL-SCNC: 4.5 MMOL/L — SIGNIFICANT CHANGE UP (ref 3.5–5.3)
POTASSIUM SERPL-SCNC: 4.6 MMOL/L — SIGNIFICANT CHANGE UP (ref 3.5–5.3)
POTASSIUM SERPL-SCNC: 4.7 MMOL/L — SIGNIFICANT CHANGE UP (ref 3.5–5.3)
RBC # BLD: 4.54 M/UL — SIGNIFICANT CHANGE UP (ref 4.2–5.8)
RBC # FLD: 12.1 % — SIGNIFICANT CHANGE UP (ref 10.3–16.9)
S PNEUM AG SER QL: SIGNIFICANT CHANGE UP
SODIUM SERPL-SCNC: 123 MMOL/L — LOW (ref 135–145)
SODIUM SERPL-SCNC: 125 MMOL/L — LOW (ref 135–145)
SODIUM SERPL-SCNC: 127 MMOL/L — LOW (ref 135–145)
TSH SERPL-MCNC: 1.18 UIU/ML — SIGNIFICANT CHANGE UP (ref 0.35–4.94)
URATE SERPL-MCNC: 4.4 MG/DL — SIGNIFICANT CHANGE UP (ref 3.4–8.8)
WBC # BLD: 8.8 K/UL — SIGNIFICANT CHANGE UP (ref 3.8–10.5)
WBC # FLD AUTO: 8.8 K/UL — SIGNIFICANT CHANGE UP (ref 3.8–10.5)

## 2017-07-15 PROCEDURE — 82570 ASSAY OF URINE CREATININE: CPT

## 2017-07-15 PROCEDURE — 84145 PROCALCITONIN (PCT): CPT

## 2017-07-15 PROCEDURE — 87899 AGENT NOS ASSAY W/OPTIC: CPT

## 2017-07-15 PROCEDURE — 96374 THER/PROPH/DIAG INJ IV PUSH: CPT

## 2017-07-15 PROCEDURE — 87486 CHLMYD PNEUM DNA AMP PROBE: CPT

## 2017-07-15 PROCEDURE — 87581 M.PNEUMON DNA AMP PROBE: CPT

## 2017-07-15 PROCEDURE — 84443 ASSAY THYROID STIM HORMONE: CPT

## 2017-07-15 PROCEDURE — 71045 X-RAY EXAM CHEST 1 VIEW: CPT

## 2017-07-15 PROCEDURE — 80053 COMPREHEN METABOLIC PANEL: CPT

## 2017-07-15 PROCEDURE — 87040 BLOOD CULTURE FOR BACTERIA: CPT

## 2017-07-15 PROCEDURE — 99285 EMERGENCY DEPT VISIT HI MDM: CPT | Mod: 25

## 2017-07-15 PROCEDURE — 86738 MYCOPLASMA ANTIBODY: CPT

## 2017-07-15 PROCEDURE — 83935 ASSAY OF URINE OSMOLALITY: CPT

## 2017-07-15 PROCEDURE — 93005 ELECTROCARDIOGRAM TRACING: CPT

## 2017-07-15 PROCEDURE — 87633 RESP VIRUS 12-25 TARGETS: CPT

## 2017-07-15 PROCEDURE — 80048 BASIC METABOLIC PNL TOTAL CA: CPT

## 2017-07-15 PROCEDURE — 84100 ASSAY OF PHOSPHORUS: CPT

## 2017-07-15 PROCEDURE — 87449 NOS EACH ORGANISM AG IA: CPT

## 2017-07-15 PROCEDURE — 84484 ASSAY OF TROPONIN QUANT: CPT

## 2017-07-15 PROCEDURE — 85025 COMPLETE CBC W/AUTO DIFF WBC: CPT

## 2017-07-15 PROCEDURE — 87798 DETECT AGENT NOS DNA AMP: CPT

## 2017-07-15 PROCEDURE — 83930 ASSAY OF BLOOD OSMOLALITY: CPT

## 2017-07-15 PROCEDURE — 36415 COLL VENOUS BLD VENIPUNCTURE: CPT

## 2017-07-15 PROCEDURE — 84146 ASSAY OF PROLACTIN: CPT

## 2017-07-15 PROCEDURE — 87081 CULTURE SCREEN ONLY: CPT

## 2017-07-15 PROCEDURE — 85027 COMPLETE CBC AUTOMATED: CPT

## 2017-07-15 PROCEDURE — 99232 SBSQ HOSP IP/OBS MODERATE 35: CPT | Mod: GC

## 2017-07-15 PROCEDURE — 96375 TX/PRO/DX INJ NEW DRUG ADDON: CPT

## 2017-07-15 PROCEDURE — 93306 TTE W/DOPPLER COMPLETE: CPT

## 2017-07-15 PROCEDURE — 84300 ASSAY OF URINE SODIUM: CPT

## 2017-07-15 PROCEDURE — 71010: CPT | Mod: 26

## 2017-07-15 PROCEDURE — 83735 ASSAY OF MAGNESIUM: CPT

## 2017-07-15 PROCEDURE — 80061 LIPID PANEL: CPT

## 2017-07-15 PROCEDURE — 84550 ASSAY OF BLOOD/URIC ACID: CPT

## 2017-07-15 RX ORDER — LISINOPRIL 2.5 MG/1
1 TABLET ORAL
Qty: 0 | Refills: 0 | COMMUNITY
Start: 2017-07-15

## 2017-07-15 RX ORDER — PANTOPRAZOLE SODIUM 20 MG/1
1 TABLET, DELAYED RELEASE ORAL
Qty: 0 | Refills: 0 | COMMUNITY
Start: 2017-07-15

## 2017-07-15 RX ORDER — METOPROLOL TARTRATE 50 MG
1 TABLET ORAL
Qty: 0 | Refills: 0 | COMMUNITY
Start: 2017-07-15

## 2017-07-15 RX ORDER — MAGNESIUM OXIDE 400 MG ORAL TABLET 241.3 MG
1 TABLET ORAL
Qty: 0 | Refills: 0 | COMMUNITY
Start: 2017-07-15

## 2017-07-15 RX ORDER — MAGNESIUM SULFATE 500 MG/ML
1 VIAL (ML) INJECTION ONCE
Qty: 0 | Refills: 0 | Status: DISCONTINUED | OUTPATIENT
Start: 2017-07-15 | End: 2017-07-15

## 2017-07-15 RX ORDER — ATORVASTATIN CALCIUM 80 MG/1
1 TABLET, FILM COATED ORAL
Qty: 0 | Refills: 0 | COMMUNITY
Start: 2017-07-15

## 2017-07-15 RX ORDER — CHOLECALCIFEROL (VITAMIN D3) 125 MCG
1000 CAPSULE ORAL
Qty: 0 | Refills: 0 | COMMUNITY
Start: 2017-07-15

## 2017-07-15 RX ORDER — CLOPIDOGREL BISULFATE 75 MG/1
1 TABLET, FILM COATED ORAL
Qty: 0 | Refills: 0 | COMMUNITY
Start: 2017-07-15

## 2017-07-15 RX ORDER — CIPROFLOXACIN LACTATE 400MG/40ML
1 VIAL (ML) INTRAVENOUS
Qty: 4 | Refills: 0 | OUTPATIENT
Start: 2017-07-15 | End: 2017-07-19

## 2017-07-15 RX ADMIN — Medication 100 MILLIGRAM(S): at 01:28

## 2017-07-15 RX ADMIN — Medication 1000 UNIT(S): at 11:13

## 2017-07-15 RX ADMIN — HEPARIN SODIUM 5000 UNIT(S): 5000 INJECTION INTRAVENOUS; SUBCUTANEOUS at 06:10

## 2017-07-15 RX ADMIN — MAGNESIUM OXIDE 400 MG ORAL TABLET 400 MILLIGRAM(S): 241.3 TABLET ORAL at 11:13

## 2017-07-15 RX ADMIN — SODIUM CHLORIDE 25 MILLILITER(S): 5 INJECTION, SOLUTION INTRAVENOUS at 00:08

## 2017-07-15 RX ADMIN — Medication 25 MILLIGRAM(S): at 06:10

## 2017-07-15 RX ADMIN — CLOPIDOGREL BISULFATE 75 MILLIGRAM(S): 75 TABLET, FILM COATED ORAL at 11:13

## 2017-07-15 RX ADMIN — HEPARIN SODIUM 5000 UNIT(S): 5000 INJECTION INTRAVENOUS; SUBCUTANEOUS at 13:55

## 2017-07-15 RX ADMIN — PANTOPRAZOLE SODIUM 40 MILLIGRAM(S): 20 TABLET, DELAYED RELEASE ORAL at 06:11

## 2017-07-15 RX ADMIN — LISINOPRIL 40 MILLIGRAM(S): 2.5 TABLET ORAL at 06:10

## 2017-07-15 NOTE — PROGRESS NOTE ADULT - PROBLEM SELECTOR PLAN 1
He has a clinical presentation of Pneumonia and CT imaging showing  RML and RLL peribronchovascular interstitial PNA with reticulonodular appearance with some pulmonary nodules in RML measuring 1.2 cm.(Image couldn't be reviewed as we have only report). We would have to review it to r/o possible underlying malignancy, other ILD with peribronchovascular distribution.  -Can switch to oral CAP coverage per primary  -Urine legionella -ve, Respiratory viral panel positive for parainfluenza 3. Superinfection with bacterial pathogens possible  -Blood culture, negative so far  -Procalcitonin mildly elevated  - We will need to have a follow up CT to evaluate for resolution of consolidation and nodules reported , to r/o underlying malignancy or other ILD(though the unilateral lesion speaks against it).  -Clinically improved and is Ok from pulm standpoint to be dced on home on oral abx coverage.  - recommend pneumonia vaccine if not up to date.  - Thank you for the consult.

## 2017-07-15 NOTE — DISCHARGE NOTE ADULT - CARE PLAN
Principal Discharge DX:	Community acquired bacterial pneumonia  Goal:	Continue antibiotics  Instructions for follow-up, activity and diet:	You were found to have pneumonia, which was viral and bacterial. Please take your antibiotic (levaquin) until completion. Take one every day starting tomorrow. Return to the ED if you develop fever. If your cough persists, see your PCP  Secondary Diagnosis:	CAD (coronary artery disease)  Instructions for follow-up, activity and diet:	Continue taking plavix and metoprolol  Secondary Diagnosis:	HTN (hypertension)  Instructions for follow-up, activity and diet:	continue taking lisinopril  Secondary Diagnosis:	HLD (hyperlipidemia)  Instructions for follow-up, activity and diet:	continue taking lipitor  Secondary Diagnosis:	Hyponatremia  Instructions for follow-up, activity and diet:	Your sodium was low possibly from aldactone, poor diet or excess fluid. Please discontinue taking aldactone unless your PCP restarts it. Follow up with Dr Evans on monday at 11:30 to recheck your sodium. Avoid drinking excess water. Limit fluid intake to meals and when thirsty. Do not make yourself dehydrated.

## 2017-07-15 NOTE — PROGRESS NOTE ADULT - PROBLEM SELECTOR PLAN 1
- the labs and the clinical picture consistent with SIADH   - received hypertonic saline last night 25 ml/h for 2 hours - the sodium trended up   - this afternoon the sodium is to 127   - clinically no confusion - oriented   - the patient can be discharge home with good follow up  with primary on monday for BMP   - for now no need of sodium tablets or lasix

## 2017-07-15 NOTE — DISCHARGE NOTE ADULT - HOSPITAL COURSE
Patient is an 89 y/o M w/ pmhx of CAD s/p CABG (19 yrs ago @ [place]), subdural hematoma s/p evacuation, CVA (no residual deficits) who presented with SOB and cough x 1 week. He he went to visit PMD and he was sent for a CT of his chest, which showed RML and RLL peribronchovascular interstitial PNA with pulmonary nodules. Vaccinations were up to date including pneumococcal. Upon arrival to the ED, VS remained stable. Temp: 99.0, BP: 149/84, HR: 76, RR: 14, O2: 98% on room air. Labs were significant for hyponatremia of 125. RVP + for parainfluenza. Troponins were negative x 1, echo showed an EF 65% and EKG with no signs of ischemia. The following day the sodium decreased to 124 and work up suggested SIADH. Renal recommended discontinuing aldactone and a fluid restricted diet, which increased the sodium to 127. He also got 2 hours of 3% saline as well. As per daughter, outpatient records indicate a Na of 128 last March. As per renal and Dr Main, patient stable for discharge with PCP follow up on Monday at 11;30. Patient and family agree. Patient is an 89 y/o M w/ pmhx of CAD s/p CABG (19 yrs ago @ [place]), subdural hematoma s/p evacuation, CVA (no residual deficits) who presented with SOB and cough x 1 week. He he went to visit PMD and he was sent for a CT of his chest, which showed RML and RLL peribronchovascular interstitial PNA with pulmonary nodules. Vaccinations were up to date including pneumococcal. Upon arrival to the ED, VS remained stable. Temp: 99.0, BP: 149/84, HR: 76, RR: 14, O2: 98% on room air. Labs were significant for hyponatremia of 125. RVP + for parainfluenza. Troponins were negative x 1, echo showed an EF 65% and EKG with no signs of ischemia. The following day the sodium decreased to 124 and work up suggested SIADH. Renal recommended discontinuing aldactone and a fluid restricted diet, which increased the sodium to 127. He also got 2 hours of 3% saline as well. As per daughter, outpatient records indicate a Na of 128 last March. As per renal and Dr Main, patient stable for discharge with PCP follow up on Monday at 11;30. Patient and family agree. Will be sent home on 4 days of levaquin to complete a 7 day course of antibiotics. Patient is an 89 y/o M w/ pmhx of CAD s/p CABG (19 yrs ago @ [place]), subdural hematoma s/p evacuation, CVA (no residual deficits) who presented with SOB and cough x 1 week. He he went to visit PMD and he was sent for a CT of his chest, which showed RML and RLL peribronchovascular interstitial PNA with pulmonary nodules. Vaccinations were up to date including pneumococcal. Upon arrival to the ED, VS remained stable. Temp: 99.0, BP: 149/84, HR: 76, RR: 14, O2: 98% on room air. Labs were significant for hyponatremia of 125. RVP + for parainfluenza. Troponins were negative x 1, echo showed an EF 65% and EKG with no signs of ischemia. The following day the sodium decreased to 124 and work up suggested SIADH. Renal recommended discontinuing aldactone and a fluid restricted diet, which increased the sodium to 127. He also got 2 hours of 3% saline as well. As per daughter, outpatient records indicate a Na of 128 last March. As per renal and Dr Main, patient stable for discharge with PCP follow up on Monday at 11;30. Patient and family agree. Will be sent home on 4 days of levaquin to complete a 7 day course of antibiotics. Pt also instructed to get repeat CT as the last one showed questionable nodule. Dr. Evans aware. Patient is an 89 y/o M w/ pmhx of CAD s/p CABG (19 yrs ago @ [place]), subdural hematoma s/p evacuation, CVA (no residual deficits) who presented with SOB and cough x 1 week. He he went to visit PMD and he was sent for a CT of his chest, which showed RML and RLL peribronchovascular interstitial PNA with pulmonary nodules. Vaccinations were up to date including pneumococcal. Upon arrival to the ED, VS remained stable. Temp: 99.0, BP: 149/84, HR: 76, RR: 14, O2: 98% on room air. Labs were significant for hyponatremia of 125. RVP + for parainfluenza. Troponins were negative x 1, echo showed an EF 65% and EKG with no signs of ischemia. The following day the sodium decreased to 124 and work up suggested SIADH. Renal recommended discontinuing aldactone and a fluid restricted diet, which increased the sodium to 127. He also got 2 hours of 3% saline as well. As per daughter, outpatient records indicate a Na of 128 last March. As per renal and Dr Main, patient stable for discharge with PCP follow up on Monday at 11;30. Patient and family agree. Will be sent home on 4 days of levaquin to complete a 7 day course of antibiotics. Pt also instructed to get repeat CT as the last one showed questionable nodule. Dr. Evans aware.

## 2017-07-15 NOTE — PROGRESS NOTE ADULT - SUBJECTIVE AND OBJECTIVE BOX
Overnight no medical issues. Was started on Hypertonic Saline 25 ml/h for 2 hours and after that the sodium was trending up to 123   This morning no complains - completely oriented and alert.   the only complains is from the cough but according to him is getting better       89 y/o M w/ pmhx of CAD s/p CABG (19 yrs ago @ [facility]), subdural hematoma s/p evacuation, CVA (no residual defecits),. Now admitted for CAP, over the hospital stay noticed that he has hyponatremia 125 serum sodium. The patient was consulted with dr. Sheehan who recommended fluid restriction and follow up the labs. Over the day the sodium trended down to 121, and according to the team the patient has an episode of confusion for a couple of seconds. I personally talked to the patient - he is completely oriented for time place and personality around 1000 pm, no headache, no nausea or vomiting       lisinopril 40 milliGRAM(s) daily  clopidogrel Tablet 75 milliGRAM(s) daily  atorvastatin 20 milliGRAM(s) at bedtime  metoprolol succinate ER 25 milliGRAM(s) daily  pantoprazole    Tablet 40 milliGRAM(s) before breakfast  magnesium oxide 400 milliGRAM(s) daily  cholecalciferol 1000 Unit(s) daily  heparin  Injectable 5000 Unit(s) every 8 hours  acetaminophen   Tablet 650 milliGRAM(s) every 6 hours PRN  guaiFENesin    Syrup 100 milliGRAM(s) every 6 hours PRN  levoFLOXacin  Tablet 500 milliGRAM(s) every 24 hours      Allergies    No Known Allergies    Intolerances        T(C): , Max: 36.7 (07-15-17 @ 06:11)  T(F): , Max: 98.1 (07-15-17 @ 06:11)  HR: 56 (07-15-17 @ 09:02)  BP: 119/56 (07-15-17 @ 09:02)  BP(mean): 80 (07-15-17 @ 09:02)  RR: 18 (07-15-17 @ 09:02)  SpO2: 94% (07-15-17 @ 09:02)  Wt(kg): --    07-14 @ 07:01  -  07-15 @ 07:00  --------------------------------------------------------  IN:    IV PiggyBack: 50 mL    Oral Fluid: 740 mL    sodium chloride 3%: 60 mL  Total IN: 850 mL    OUT:    Voided: 475 mL  Total OUT: 475 mL    Total NET: 375 mL      Physical exam;   Alert and oriented  Tongue moist   No JVD  Pupils reactive to light   Lungs: normal air entry bilaterally, mild wheezing, no crackles   Heart: rrr, normal s1/s2, no murmurs, rubs or gallops   Abdomen: soft, non tender, non distended, normal bowel sounds   Extremeties: no peripehral edemea      07-15 @ 07:01  -  07-15 @ 14:03  --------------------------------------------------------  IN:    Oral Fluid: 340 mL  Total IN: 340 mL    OUT:  Total OUT: 0 mL    Total NET: 340 mL              LABS:                        13.6   8.8   )-----------( 242      ( 15 Jul 2017 09:00 )             38.0     07-15    127<L>  |  89<L>  |  15  ----------------------------<  130<H>  4.5   |  24  |  1.10    Ca    9.2      15 Jul 2017 12:54  Phos  2.4     07-14  Mg     1.9     07-15    TPro  6.9  /  Alb  3.7  /  TBili  0.8  /  DBili  x   /  AST  25  /  ALT  15  /  AlkPhos  70  07-13    Uric Acid, Serum: 4.4 mg/dL [3.4 - 8.8] (07-15 @ 09:00)  Osmolality, Serum: 257 mosm/kg <L> [280 - 301] (07-14 @ 15:28)        Osmolality, Random Urine: 510 mosmol/kg [100 - 650] (07-14 @ 11:33)  Sodium, Random Urine: 113 mmol/L (07-14 @ 11:33)        RADIOLOGY & ADDITIONAL STUDIES: Overnight no medical issues. Was started on Hypertonic Saline 25 ml/h for 2 hours and after that the sodium was trending up to 123   This morning no complains - completely oriented and alert.   the only complains is from the cough but according to him is getting better -- says is going home definitely today       89 y/o M w/ pmhx of CAD s/p CABG (19 yrs ago @ [facility]), subdural hematoma s/p evacuation, CVA (no residual defecits),. Now admitted for CAP, over the hospital stay noticed that he has hyponatremia 125 serum sodium. The patient was consulted with dr. Sheehan who recommended fluid restriction and follow up the labs. Over the day the sodium trended down to 121, and according to the team the patient has an episode of confusion for a couple of seconds. I personally talked to the patient - he is completely oriented for time place and personality around 1000 pm, no headache, no nausea or vomiting       lisinopril 40 milliGRAM(s) daily  clopidogrel Tablet 75 milliGRAM(s) daily  atorvastatin 20 milliGRAM(s) at bedtime  metoprolol succinate ER 25 milliGRAM(s) daily  pantoprazole    Tablet 40 milliGRAM(s) before breakfast  magnesium oxide 400 milliGRAM(s) daily  cholecalciferol 1000 Unit(s) daily  heparin  Injectable 5000 Unit(s) every 8 hours  acetaminophen   Tablet 650 milliGRAM(s) every 6 hours PRN  guaiFENesin    Syrup 100 milliGRAM(s) every 6 hours PRN  levoFLOXacin  Tablet 500 milliGRAM(s) every 24 hours      Allergies    No Known Allergies    Intolerances        T(C): , Max: 36.7 (07-15-17 @ 06:11)  T(F): , Max: 98.1 (07-15-17 @ 06:11)  HR: 56 (07-15-17 @ 09:02)  BP: 119/56 (07-15-17 @ 09:02)  BP(mean): 80 (07-15-17 @ 09:02)  RR: 18 (07-15-17 @ 09:02)  SpO2: 94% (07-15-17 @ 09:02)  Wt(kg): --    07-14 @ 07:01  -  07-15 @ 07:00  --------------------------------------------------------  IN:    IV PiggyBack: 50 mL    Oral Fluid: 740 mL    sodium chloride 3%: 60 mL  Total IN: 850 mL    OUT:    Voided: 475 mL  Total OUT: 475 mL    Total NET: 375 mL      Physical exam;   Alert and oriented  Tongue moist   No JVD  Pupils reactive to light   Lungs: normal air entry bilaterally, mild wheezing, no crackles   Heart: rrr, normal s1/s2, no murmurs, rubs or gallops   Abdomen: soft, non tender, non distended, normal bowel sounds   Extremeties: no peripehral edemea  skin no rash    07-15 @ 07:01  -  07-15 @ 14:03  --------------------------------------------------------  IN:    Oral Fluid: 340 mL  Total IN: 340 mL    OUT:  Total OUT: 0 mL    Total NET: 340 mL              LABS:                        13.6   8.8   )-----------( 242      ( 15 Jul 2017 09:00 )             38.0     07-15    127<L>  |  89<L>  |  15  ----------------------------<  130<H>  4.5   |  24  |  1.10    Ca    9.2      15 Jul 2017 12:54  Phos  2.4     07-14  Mg     1.9     07-15    TPro  6.9  /  Alb  3.7  /  TBili  0.8  /  DBili  x   /  AST  25  /  ALT  15  /  AlkPhos  70  07-13    Uric Acid, Serum: 4.4 mg/dL [3.4 - 8.8] (07-15 @ 09:00)  Osmolality, Serum: 257 mosm/kg <L> [280 - 301] (07-14 @ 15:28)        Osmolality, Random Urine: 510 mosmol/kg [100 - 650] (07-14 @ 11:33)  Sodium, Random Urine: 113 mmol/L (07-14 @ 11:33)        RADIOLOGY & ADDITIONAL STUDIES:

## 2017-07-15 NOTE — DISCHARGE NOTE ADULT - PLAN OF CARE
Continue antibiotics You were found to have pneumonia, which was viral and bacterial. Please take your antibiotic (levaquin) until completion. Take one every day starting tomorrow. Return to the ED if you develop fever. If your cough persists, see your PCP Continue taking plavix and metoprolol continue taking lisinopril continue taking lipitor Your sodium was low possibly from aldactone, poor diet or excess fluid. Please discontinue taking aldactone unless your PCP restarts it. Follow up with Dr Evans on monday at 11:30 to recheck your sodium. Avoid drinking excess water. Limit fluid intake to meals and when thirsty. Do not make yourself dehydrated.

## 2017-07-15 NOTE — DISCHARGE NOTE ADULT - CARE PROVIDER_API CALL
Tyron Evans), Internal Medicine  [address] [address]  Phone: [phone]  Fax: [phone]

## 2017-07-15 NOTE — PROGRESS NOTE ADULT - PROBLEM SELECTOR PROBLEM 1
Hyponatremia
Community acquired bacterial pneumonia
Pneumonia of right lung due to infectious organism, unspecified part of lung
R/O Pneumonia of right lung due to infectious organism, unspecified part of lung

## 2017-07-15 NOTE — PROGRESS NOTE ADULT - PROBLEM SELECTOR PLAN 3
- the patient on Spirinolactone - stopped this morning because of the hyponatremia   - reconsider the need of it

## 2017-07-15 NOTE — DISCHARGE NOTE ADULT - MEDICATION SUMMARY - MEDICATIONS TO TAKE
I will START or STAY ON the medications listed below when I get home from the hospital:    lisinopril 40 mg oral tablet  -- 1 tab(s) by mouth once a day  -- Indication: For HTN (hypertension)    atorvastatin 20 mg oral tablet  -- 1 tab(s) by mouth once a day (at bedtime)  -- Indication: For HLD (hyperlipidemia)    clopidogrel 75 mg oral tablet  -- 1 tab(s) by mouth once a day  -- Indication: For CAD (coronary artery disease)    metoprolol succinate 25 mg oral tablet, extended release  -- 1 tab(s) by mouth once a day  -- Indication: For CAD (coronary artery disease)    magnesium oxide 400 mg (241.3 mg elemental magnesium) oral tablet  -- 1 tab(s) by mouth once a day  -- Indication: For Nutrition, metabolism, and development symptoms    pantoprazole 40 mg oral delayed release tablet  -- 1 tab(s) by mouth once a day (before a meal)  -- Indication: For Nutrition, metabolism, and development symptoms    Levaquin 500 mg oral tablet  -- 1 tab(s) by mouth every 24 hours  -- Indication: For PNEUMONIA    cholecalciferol oral tablet  -- 1000 unit(s) by mouth once a day  -- Indication: For Nutrition, metabolism, and development symptoms

## 2017-07-15 NOTE — PROGRESS NOTE ADULT - ATTENDING COMMENTS
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

## 2017-07-15 NOTE — DISCHARGE NOTE ADULT - PATIENT PORTAL LINK FT
“You can access the [facility] Patient Portal, offered by [facility], by registering with the following website: [email or web address]”

## 2017-07-15 NOTE — PROGRESS NOTE ADULT - SUBJECTIVE AND OBJECTIVE BOX
Interval Events:  Patient seen and examined at bedside. No acute events overnight. Patient denies shortness of breath or chest pain.    MEDICATIONS:  Pulmonary:  guaiFENesin    Syrup 100 milliGRAM(s) Oral every 6 hours PRN    Antimicrobials:  levoFLOXacin  Tablet 500 milliGRAM(s) Oral every 24 hours    Anticoagulants:  clopidogrel Tablet 75 milliGRAM(s) Oral daily  heparin  Injectable 5000 Unit(s) SubCutaneous every 8 hours    Cardiac:  lisinopril 40 milliGRAM(s) Oral daily  metoprolol succinate ER 25 milliGRAM(s) Oral daily    Endocrine:  atorvastatin 20 milliGRAM(s) Oral at bedtime    Allergies    No Known Allergies    Intolerances        Vital Signs Last 24 Hrs  T(C): 35.6 (15 Jul 2017 10:00), Max: 36.8 (14 Jul 2017 14:00)  T(F): 96 (15 Jul 2017 10:00), Max: 98.2 (14 Jul 2017 14:00)  HR: 56 (15 Jul 2017 09:02) (54 - 82)  BP: 119/56 (15 Jul 2017 09:02) (119/56 - 154/70)  BP(mean): 80 (15 Jul 2017 09:02) (80 - 113)  RR: 18 (15 Jul 2017 09:02) (16 - 18)  SpO2: 94% (15 Jul 2017 09:02) (94% - 96%)    07-14 @ 07:01  -  07-15 @ 07:00  --------------------------------------------------------  IN: 850 mL / OUT: 475 mL / NET: 375 mL    07-15 @ 07:01  -  07-15 @ 12:33  --------------------------------------------------------  IN: 100 mL / OUT: 0 mL / NET: 100 mL    PHYSICAL EXAM:    General: Well developed; well nourished; in no acute distress  Eyes: PERRL, EOM intact; conjunctiva and sclera clear  Head: Normocephalic; atraumatic  ENMT: No nasal discharge; airway clear  Neck: Supple; non tender; no masses  Respiratory: clear to auscultation bilaterally without wheezing, rhonchi or rales  Cardiovascular: Regular rate and rhythm. S1 and S2 Normal; No murmurs, gallops or rubs  Gastrointestinal: Soft non-tender non-distended; Normal bowel sounds; No hepatosplenomegaly  Genitourinary: No costovertebral angle tenderness  Extremities: Normal range of motion, No clubbing, cyanosis or edema  Vascular: Peripheral pulses palpable 2+ bilaterally  Neurological: Alert and oriented x3  Skin: Warm and dry. No obvious rash  Lymph Nodes: No acute cervical or supraclavicular adenopathy  Musculoskeletal: Normal gait, tone, without deformities      LABS:      CBC Full  -  ( 15 Jul 2017 09:00 )  WBC Count : 8.8 K/uL  Hemoglobin : 13.6 g/dL  Hematocrit : 38.0 %  Platelet Count - Automated : 242 K/uL  Mean Cell Volume : 83.7 fL  Mean Cell Hemoglobin : 30.0 pg  Mean Cell Hemoglobin Concentration : 35.8 g/dL  Auto Neutrophil # : x  Auto Lymphocyte # : x  Auto Monocyte # : x  Auto Eosinophil # : x  Auto Basophil # : x  Auto Neutrophil % : x  Auto Lymphocyte % : x  Auto Monocyte % : x  Auto Eosinophil % : x  Auto Basophil % : x    07-15    125<L>  |  90<L>  |  13  ----------------------------<  116<H>  4.7   |  22  |  1.00    Ca    8.9      15 Jul 2017 09:00  Phos  2.4     07-14  Mg     1.9     07-15    TPro  6.9  /  Alb  3.7  /  TBili  0.8  /  DBili  x   /  AST  25  /  ALT  15  /  AlkPhos  70  07-13                      RADIOLOGY & ADDITIONAL STUDIES (The following images were personally reviewed):

## 2017-07-15 NOTE — PROGRESS NOTE ADULT - ASSESSMENT
This is 88 year old male with past medical history stated above. Clinically and from the labs consistent with SIADH - treated with hypertonic saline and fluid restriction. The sodium is trending up

## 2017-07-15 NOTE — PROGRESS NOTE ADULT - ASSESSMENT
An 89yo M hx of CAD s/p CABG on ASA/plavix, subdural hematoma s/p evacuation, hx stroke no residual, presenting for cough of one week duration with associated progressive SOB, productive cough and fever.

## 2017-07-18 DIAGNOSIS — R91.8 OTHER NONSPECIFIC ABNORMAL FINDING OF LUNG FIELD: ICD-10-CM

## 2017-07-18 DIAGNOSIS — Z79.02 LONG TERM (CURRENT) USE OF ANTITHROMBOTICS/ANTIPLATELETS: ICD-10-CM

## 2017-07-18 DIAGNOSIS — J18.9 PNEUMONIA, UNSPECIFIED ORGANISM: ICD-10-CM

## 2017-07-18 DIAGNOSIS — Z86.79 PERSONAL HISTORY OF OTHER DISEASES OF THE CIRCULATORY SYSTEM: ICD-10-CM

## 2017-07-18 DIAGNOSIS — B34.8 OTHER VIRAL INFECTIONS OF UNSPECIFIED SITE: ICD-10-CM

## 2017-07-18 DIAGNOSIS — J12.2 PARAINFLUENZA VIRUS PNEUMONIA: ICD-10-CM

## 2017-07-18 DIAGNOSIS — I25.10 ATHEROSCLEROTIC HEART DISEASE OF NATIVE CORONARY ARTERY WITHOUT ANGINA PECTORIS: ICD-10-CM

## 2017-07-18 DIAGNOSIS — J84.89 OTHER SPECIFIED INTERSTITIAL PULMONARY DISEASES: ICD-10-CM

## 2017-07-18 DIAGNOSIS — Z95.1 PRESENCE OF AORTOCORONARY BYPASS GRAFT: ICD-10-CM

## 2017-07-18 DIAGNOSIS — E22.2 SYNDROME OF INAPPROPRIATE SECRETION OF ANTIDIURETIC HORMONE: ICD-10-CM

## 2017-07-18 DIAGNOSIS — Z79.82 LONG TERM (CURRENT) USE OF ASPIRIN: ICD-10-CM

## 2017-07-18 DIAGNOSIS — Z87.820 PERSONAL HISTORY OF TRAUMATIC BRAIN INJURY: ICD-10-CM

## 2017-07-18 DIAGNOSIS — E78.5 HYPERLIPIDEMIA, UNSPECIFIED: ICD-10-CM

## 2017-07-18 DIAGNOSIS — I15.9 SECONDARY HYPERTENSION, UNSPECIFIED: ICD-10-CM

## 2017-07-18 DIAGNOSIS — I10 ESSENTIAL (PRIMARY) HYPERTENSION: ICD-10-CM

## 2017-07-18 DIAGNOSIS — J30.9 ALLERGIC RHINITIS, UNSPECIFIED: ICD-10-CM

## 2017-07-18 LAB
CULTURE RESULTS: SIGNIFICANT CHANGE UP
CULTURE RESULTS: SIGNIFICANT CHANGE UP
SPECIMEN SOURCE: SIGNIFICANT CHANGE UP
SPECIMEN SOURCE: SIGNIFICANT CHANGE UP

## 2017-07-19 LAB
M PNEUMO IGG SER IA-ACNC: 1.31 INDEX — HIGH
M PNEUMO IGG SER IA-ACNC: POSITIVE
M PNEUMO IGM SER-ACNC: 85 UNITS/ML — SIGNIFICANT CHANGE UP
MYCOPLASMA AG SPEC QL: NEGATIVE — SIGNIFICANT CHANGE UP

## 2017-07-23 LAB
CULTURE RESULTS: SIGNIFICANT CHANGE UP
SPECIMEN SOURCE: SIGNIFICANT CHANGE UP

## 2017-09-26 NOTE — H&P ADULT - PROBLEM SELECTOR PLAN 5
Normotensive upon arrival. Home medications include Ramipril 10mg, Metoprolol 25 ER daily.   - c/w Lisinopril 40 and Metoprolol 25  - carefully watch BP in setting of PNA. Consider removing 1 agent if BP labile details…

## 2023-12-14 NOTE — ED ADULT TRIAGE NOTE - NSWEIGHTCALCTOOLDRUG_GEN_A_CORE
Miralax 17 grams 1-2 times per day  Fruits and vegetables  Fluids    Follow up in 2 weeks for recheck  
 used

## 2025-06-14 NOTE — PROGRESS NOTE ADULT - PROBLEM SELECTOR PROBLEM 2
June 14, 2025         Patient: Sammy May   YOB: 1977   Date of Visit: 6/14/2025           To Whom it May Concern:    Sammy May was seen in my clinic on 6/14/2025. He may return to work on 6/17/2025.    If you have any questions or concerns, please don't hesitate to call.        Sincerely,           Uri Cabral M.D.  Electronically Signed     
Parainfluenza
Hyponatremia